# Patient Record
Sex: FEMALE | Race: WHITE | NOT HISPANIC OR LATINO | URBAN - METROPOLITAN AREA
[De-identification: names, ages, dates, MRNs, and addresses within clinical notes are randomized per-mention and may not be internally consistent; named-entity substitution may affect disease eponyms.]

---

## 2018-11-07 ENCOUNTER — APPOINTMENT (OUTPATIENT)
Dept: WOMENS IMAGING | Facility: HOSPITAL | Age: 53
End: 2018-11-07

## 2018-11-07 PROCEDURE — 77067 SCR MAMMO BI INCL CAD: CPT | Performed by: RADIOLOGY

## 2018-11-07 PROCEDURE — 77063 BREAST TOMOSYNTHESIS BI: CPT | Performed by: RADIOLOGY

## 2019-03-20 ENCOUNTER — OFFICE VISIT (OUTPATIENT)
Dept: CARDIOLOGY | Facility: CLINIC | Age: 54
End: 2019-03-20

## 2019-03-20 VITALS
WEIGHT: 228 LBS | SYSTOLIC BLOOD PRESSURE: 130 MMHG | BODY MASS INDEX: 34.56 KG/M2 | HEIGHT: 68 IN | DIASTOLIC BLOOD PRESSURE: 80 MMHG | HEART RATE: 80 BPM

## 2019-03-20 DIAGNOSIS — R00.2 PALPITATIONS: Primary | ICD-10-CM

## 2019-03-20 PROCEDURE — 93000 ELECTROCARDIOGRAM COMPLETE: CPT | Performed by: INTERNAL MEDICINE

## 2019-03-20 PROCEDURE — 99204 OFFICE O/P NEW MOD 45 MIN: CPT | Performed by: INTERNAL MEDICINE

## 2019-03-20 RX ORDER — OMEPRAZOLE 40 MG/1
40 CAPSULE, DELAYED RELEASE ORAL DAILY
COMMUNITY
Start: 2019-03-11 | End: 2021-01-18 | Stop reason: HOSPADM

## 2019-03-21 NOTE — PROGRESS NOTES
Subjective:     Encounter Date:03/20/2019      Patient ID: Vivienne Barbour is a 54 y.o. female.    Chief Complaint:  Palpitations    This is a new problem. The current episode started more than 1 month ago. The problem occurs intermittently. The problem has been waxing and waning. Associated symptoms include malaise/fatigue.       54-year old female who presents today for evaluation.  Patient said about 10 months ago she was at a [employer]  outing.  She was walking a large amount and when she got incredibly short of breath dizzy started having tingling in her fingers.  Her heart rate went to as high as 240 bpm.  She also got incredibly short of breath.  Patient said she sat down and things had improved relatively quickly.  She says that since then she has had some intermittent episodes of ankle edema.  She also complains of chest discomfort that happened about a month or so ago.  She said it occurred after a meal radiated to her left shoulder.  She does have known acid reflux and is symptomatic from that from time to time.  She subsequently presents today for further evaluation    Review of Systems   Constitution: Positive for malaise/fatigue.   HENT: Positive for hearing loss.    Eyes: Positive for double vision.   Cardiovascular: Positive for palpitations.   Respiratory: Positive for wheezing.    All other systems reviewed and are negative.        ECG 12 Lead  Date/Time: 3/20/2019 8:20 AM  Performed by: Jarvis George MD  Authorized by: Jarvis George MD   Previous ECG: no previous ECG available  Rhythm: sinus rhythm    Clinical impression: normal ECG               Objective:     Physical Exam   Constitutional: She is oriented to person, place, and time. She appears well-developed.   HENT:   Head: Normocephalic.   Eyes: Conjunctivae are normal.   Neck: Normal range of motion.   Cardiovascular: Normal rate, regular rhythm and normal heart sounds.   Pulmonary/Chest: Breath sounds normal.   Abdominal:  Soft. Bowel sounds are normal.   Musculoskeletal: Normal range of motion. She exhibits no edema.   Neurological: She is alert and oriented to person, place, and time.   Skin: Skin is warm and dry.   Psychiatric: She has a normal mood and affect. Her behavior is normal.   Vitals reviewed.      Lab Review:       Assessment:          Diagnosis Plan   1. Palpitations  Adult Transthoracic Echo Complete W/ Cont if Necessary Per Protocol          Plan:       1.  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  Patient now presents having episode of palpitations along with shortness of breath and atypical chest discomfort.  Her chest discomfort could easily be reflux.  She had an episode of where she exerted herself walking up a hill during a Boy  outing.  [place] so she was not at a great altitude but the hill was steep.  I was set up for an echocardiogram to rule out any type of structural heart disease.  If her echo is unremarkable then doing further testing I would postpone until she starts to try to get herself in better shape.  I think she needs to start exercising consistently which she has not done and I would clinically see what happens.  If she gets better great and we know that this is deconditioning.  If she has further problems it would guide appropriate testing in the future.  2.  Palpitations really occur infrequently.  At this point I would follow clinically to we could consider a monitor if they recur more frequently.

## 2019-04-03 ENCOUNTER — HOSPITAL ENCOUNTER (OUTPATIENT)
Dept: CARDIOLOGY | Facility: HOSPITAL | Age: 54
Discharge: HOME | End: 2019-04-03
Admitting: INTERNAL MEDICINE

## 2019-04-03 VITALS
HEART RATE: 83 BPM | DIASTOLIC BLOOD PRESSURE: 80 MMHG | BODY MASS INDEX: 34.56 KG/M2 | HEIGHT: 68 IN | SYSTOLIC BLOOD PRESSURE: 140 MMHG | WEIGHT: 228 LBS

## 2019-04-03 DIAGNOSIS — R00.2 PALPITATIONS: ICD-10-CM

## 2019-04-03 LAB
ASCENDING AORTA: 2.7 CM
BH CV ECHO MEAS - ACS: 1.8 CM
BH CV ECHO MEAS - AO MAX PG (FULL): 3 MMHG
BH CV ECHO MEAS - AO MAX PG: 11.4 MMHG
BH CV ECHO MEAS - AO MEAN PG (FULL): 2.5 MMHG
BH CV ECHO MEAS - AO MEAN PG: 6.6 MMHG
BH CV ECHO MEAS - AO ROOT AREA (BSA CORRECTED): 1.3
BH CV ECHO MEAS - AO ROOT AREA: 6 CM^2
BH CV ECHO MEAS - AO ROOT DIAM: 2.8 CM
BH CV ECHO MEAS - AO V2 MAX: 168.7 CM/SEC
BH CV ECHO MEAS - AO V2 MEAN: 121.6 CM/SEC
BH CV ECHO MEAS - AO V2 VTI: 32.7 CM
BH CV ECHO MEAS - AVA(I,A): 2.5 CM^2
BH CV ECHO MEAS - AVA(I,D): 2.5 CM^2
BH CV ECHO MEAS - AVA(V,A): 2.4 CM^2
BH CV ECHO MEAS - AVA(V,D): 2.4 CM^2
BH CV ECHO MEAS - BSA(HAYCOCK): 2.3 M^2
BH CV ECHO MEAS - BSA: 2.2 M^2
BH CV ECHO MEAS - BZI_BMI: 34.7 KILOGRAMS/M^2
BH CV ECHO MEAS - BZI_METRIC_HEIGHT: 172.7 CM
BH CV ECHO MEAS - BZI_METRIC_WEIGHT: 103.4 KG
BH CV ECHO MEAS - EDV(MOD-SP2): 57 ML
BH CV ECHO MEAS - EDV(MOD-SP4): 83 ML
BH CV ECHO MEAS - EDV(TEICH): 154.9 ML
BH CV ECHO MEAS - EF(CUBED): 73.4 %
BH CV ECHO MEAS - EF(MOD-BP): 61 %
BH CV ECHO MEAS - EF(MOD-SP2): 57.9 %
BH CV ECHO MEAS - EF(MOD-SP4): 62.7 %
BH CV ECHO MEAS - EF(TEICH): 64.5 %
BH CV ECHO MEAS - ESV(MOD-SP2): 24 ML
BH CV ECHO MEAS - ESV(MOD-SP4): 31 ML
BH CV ECHO MEAS - ESV(TEICH): 55 ML
BH CV ECHO MEAS - FS: 35.7 %
BH CV ECHO MEAS - IVS/LVPW: 1
BH CV ECHO MEAS - IVSD: 1 CM
BH CV ECHO MEAS - LAT PEAK E' VEL: 12 CM/SEC
BH CV ECHO MEAS - LV DIASTOLIC VOL/BSA (35-75): 38.4 ML/M^2
BH CV ECHO MEAS - LV MASS(C)D: 226.9 GRAMS
BH CV ECHO MEAS - LV MASS(C)DI: 105 GRAMS/M^2
BH CV ECHO MEAS - LV MAX PG: 8.4 MMHG
BH CV ECHO MEAS - LV MEAN PG: 4.1 MMHG
BH CV ECHO MEAS - LV SYSTOLIC VOL/BSA (12-30): 14.3 ML/M^2
BH CV ECHO MEAS - LV V1 MAX: 145 CM/SEC
BH CV ECHO MEAS - LV V1 MEAN: 93.1 CM/SEC
BH CV ECHO MEAS - LV V1 VTI: 29 CM
BH CV ECHO MEAS - LVIDD: 5.6 CM
BH CV ECHO MEAS - LVIDS: 3.6 CM
BH CV ECHO MEAS - LVLD AP2: 6.5 CM
BH CV ECHO MEAS - LVLD AP4: 7.8 CM
BH CV ECHO MEAS - LVLS AP2: 5.4 CM
BH CV ECHO MEAS - LVLS AP4: 6 CM
BH CV ECHO MEAS - LVOT AREA (M): 2.8 CM^2
BH CV ECHO MEAS - LVOT AREA: 2.8 CM^2
BH CV ECHO MEAS - LVOT DIAM: 1.9 CM
BH CV ECHO MEAS - LVPWD: 1 CM
BH CV ECHO MEAS - MED PEAK E' VEL: 10 CM/SEC
BH CV ECHO MEAS - MR MAX PG: 46.1 MMHG
BH CV ECHO MEAS - MR MAX VEL: 339.4 CM/SEC
BH CV ECHO MEAS - MV A DUR: 0.1 SEC
BH CV ECHO MEAS - MV A MAX VEL: 104.3 CM/SEC
BH CV ECHO MEAS - MV DEC SLOPE: 455.5 CM/SEC^2
BH CV ECHO MEAS - MV DEC TIME: 0.18 SEC
BH CV ECHO MEAS - MV E MAX VEL: 88.3 CM/SEC
BH CV ECHO MEAS - MV E/A: 0.85
BH CV ECHO MEAS - MV MAX PG: 4.7 MMHG
BH CV ECHO MEAS - MV MEAN PG: 2.4 MMHG
BH CV ECHO MEAS - MV P1/2T MAX VEL: 89.2 CM/SEC
BH CV ECHO MEAS - MV P1/2T: 57.4 MSEC
BH CV ECHO MEAS - MV V2 MAX: 108.6 CM/SEC
BH CV ECHO MEAS - MV V2 MEAN: 74.6 CM/SEC
BH CV ECHO MEAS - MV V2 VTI: 24.8 CM
BH CV ECHO MEAS - MVA P1/2T LCG: 2.5 CM^2
BH CV ECHO MEAS - MVA(P1/2T): 3.8 CM^2
BH CV ECHO MEAS - MVA(VTI): 3.3 CM^2
BH CV ECHO MEAS - PA ACC TIME: 0.14 SEC
BH CV ECHO MEAS - PA MAX PG (FULL): 2.7 MMHG
BH CV ECHO MEAS - PA MAX PG: 5 MMHG
BH CV ECHO MEAS - PA PR(ACCEL): 16 MMHG
BH CV ECHO MEAS - PA V2 MAX: 111.5 CM/SEC
BH CV ECHO MEAS - PULM A REVS DUR: 0.08 SEC
BH CV ECHO MEAS - PULM A REVS VEL: 33.1 CM/SEC
BH CV ECHO MEAS - PULM DIAS VEL: 45.5 CM/SEC
BH CV ECHO MEAS - PULM S/D: 1
BH CV ECHO MEAS - PULM SYS VEL: 47.5 CM/SEC
BH CV ECHO MEAS - PVA(V,A): 2.1 CM^2
BH CV ECHO MEAS - PVA(V,D): 2.1 CM^2
BH CV ECHO MEAS - QP/QS: 0.6
BH CV ECHO MEAS - RAP SYSTOLE: 3 MMHG
BH CV ECHO MEAS - RV MAX PG: 2.3 MMHG
BH CV ECHO MEAS - RV MEAN PG: 1.4 MMHG
BH CV ECHO MEAS - RV V1 MAX: 76.2 CM/SEC
BH CV ECHO MEAS - RV V1 MEAN: 56.2 CM/SEC
BH CV ECHO MEAS - RV V1 VTI: 16 CM
BH CV ECHO MEAS - RVOT AREA: 3 CM^2
BH CV ECHO MEAS - RVOT DIAM: 2 CM
BH CV ECHO MEAS - RVSP: 26 MMHG
BH CV ECHO MEAS - SI(AO): 90.2 ML/M^2
BH CV ECHO MEAS - SI(CUBED): 60.3 ML/M^2
BH CV ECHO MEAS - SI(LVOT): 37.9 ML/M^2
BH CV ECHO MEAS - SI(MOD-SP2): 15.3 ML/M^2
BH CV ECHO MEAS - SI(MOD-SP4): 24.1 ML/M^2
BH CV ECHO MEAS - SI(TEICH): 46.2 ML/M^2
BH CV ECHO MEAS - SUP REN AO DIAM: 1.7 CM
BH CV ECHO MEAS - SV(AO): 195 ML
BH CV ECHO MEAS - SV(CUBED): 130.2 ML
BH CV ECHO MEAS - SV(LVOT): 81.9 ML
BH CV ECHO MEAS - SV(MOD-SP2): 33 ML
BH CV ECHO MEAS - SV(MOD-SP4): 52 ML
BH CV ECHO MEAS - SV(RVOT): 48.7 ML
BH CV ECHO MEAS - SV(TEICH): 99.9 ML
BH CV ECHO MEAS - TAPSE (>1.6): 2.4 CM2
BH CV ECHO MEAS - TR MAX VEL: 242.4 CM/SEC
BH CV ECHO MEASUREMENTS AVERAGE E/E' RATIO: 8.03
BH CV XLRA - RV BASE: 2.9 CM
BH CV XLRA - TDI S': 13 CM/SEC
LEFT ATRIUM VOLUME INDEX: 30 ML/M2
MAXIMAL PREDICTED HEART RATE: 167 BPM
SINUS: 2.7 CM
STJ: 1.8 CM
STRESS TARGET HR: 142 BPM

## 2019-04-03 PROCEDURE — 93306 TTE W/DOPPLER COMPLETE: CPT | Performed by: INTERNAL MEDICINE

## 2019-04-03 PROCEDURE — 93306 TTE W/DOPPLER COMPLETE: CPT

## 2019-04-08 ENCOUNTER — TELEPHONE (OUTPATIENT)
Dept: CARDIOLOGY | Facility: CLINIC | Age: 54
End: 2019-04-08

## 2019-04-08 NOTE — TELEPHONE ENCOUNTER
----- Message from MICHELLE Mcdaniels sent at 4/8/2019  9:20 AM EDT -----  Can you please make patient aware of normal echocardiogram.    Thanks,  CECE

## 2019-11-15 ENCOUNTER — APPOINTMENT (OUTPATIENT)
Dept: WOMENS IMAGING | Facility: HOSPITAL | Age: 54
End: 2019-11-15

## 2019-11-15 PROCEDURE — 77063 BREAST TOMOSYNTHESIS BI: CPT | Performed by: RADIOLOGY

## 2019-11-15 PROCEDURE — 77067 SCR MAMMO BI INCL CAD: CPT | Performed by: RADIOLOGY

## 2019-12-17 ENCOUNTER — APPOINTMENT (OUTPATIENT)
Dept: WOMENS IMAGING | Facility: HOSPITAL | Age: 54
End: 2019-12-17

## 2019-12-17 PROCEDURE — 76641 ULTRASOUND BREAST COMPLETE: CPT | Performed by: RADIOLOGY

## 2019-12-17 PROCEDURE — 77065 DX MAMMO INCL CAD UNI: CPT | Performed by: RADIOLOGY

## 2019-12-17 PROCEDURE — G0279 TOMOSYNTHESIS, MAMMO: HCPCS | Performed by: RADIOLOGY

## 2019-12-17 PROCEDURE — 77061 BREAST TOMOSYNTHESIS UNI: CPT | Performed by: RADIOLOGY

## 2020-12-11 ENCOUNTER — APPOINTMENT (OUTPATIENT)
Dept: WOMENS IMAGING | Facility: HOSPITAL | Age: 55
End: 2020-12-11

## 2020-12-11 PROCEDURE — 77063 BREAST TOMOSYNTHESIS BI: CPT | Performed by: RADIOLOGY

## 2020-12-11 PROCEDURE — 77067 SCR MAMMO BI INCL CAD: CPT | Performed by: RADIOLOGY

## 2021-01-04 ENCOUNTER — APPOINTMENT (OUTPATIENT)
Dept: WOMENS IMAGING | Facility: HOSPITAL | Age: 56
End: 2021-01-04

## 2021-01-04 PROCEDURE — 77061 BREAST TOMOSYNTHESIS UNI: CPT | Performed by: RADIOLOGY

## 2021-01-04 PROCEDURE — 77065 DX MAMMO INCL CAD UNI: CPT | Performed by: RADIOLOGY

## 2021-01-11 ENCOUNTER — APPOINTMENT (OUTPATIENT)
Dept: WOMENS IMAGING | Facility: HOSPITAL | Age: 56
End: 2021-01-11

## 2021-01-11 PROCEDURE — 19081 BX BREAST 1ST LESION STRTCTC: CPT | Performed by: RADIOLOGY

## 2021-01-13 ENCOUNTER — TELEPHONE (OUTPATIENT)
Dept: SURGERY | Facility: CLINIC | Age: 56
End: 2021-01-13

## 2021-01-13 NOTE — TELEPHONE ENCOUNTER
New patient appointment with Dr. Barbour is scheduled on 1/20/2021 @ 2:30pm.    Called and spoke to patient, patient expressed v/u of appointment time.    Sent patient a reminder letter in the mail.

## 2021-01-14 ENCOUNTER — TELEPHONE (OUTPATIENT)
Dept: SURGERY | Facility: CLINIC | Age: 56
End: 2021-01-14

## 2021-01-14 NOTE — TELEPHONE ENCOUNTER
Breast MRI is scheduled on 1/15/2021 @ 9:45am, patient arrival 9:15am.    Called and spoke to patient, patient expressed v/u of appointment times.

## 2021-01-15 ENCOUNTER — HOSPITAL ENCOUNTER (OUTPATIENT)
Dept: MRI IMAGING | Facility: HOSPITAL | Age: 56
Discharge: HOME | End: 2021-01-15
Admitting: SURGERY

## 2021-01-15 DIAGNOSIS — D05.12 DUCTAL CARCINOMA IN SITU (DCIS) OF LEFT BREAST: ICD-10-CM

## 2021-01-15 PROCEDURE — 77049 MRI BREAST C-+ W/CAD BI: CPT

## 2021-01-15 PROCEDURE — A9577 INJ MULTIHANCE: HCPCS | Performed by: SURGERY

## 2021-01-15 PROCEDURE — 0 GADOBENATE DIMEGLUMINE 529 MG/ML SOLUTION: Performed by: SURGERY

## 2021-01-15 RX ADMIN — GADOBENATE DIMEGLUMINE 20 ML: 529 INJECTION, SOLUTION INTRAVENOUS at 10:05

## 2021-01-18 ENCOUNTER — OFFICE VISIT (OUTPATIENT)
Dept: SURGERY | Facility: CLINIC | Age: 56
End: 2021-01-18

## 2021-01-18 ENCOUNTER — TRANSCRIBE ORDERS (OUTPATIENT)
Dept: SURGERY | Facility: CLINIC | Age: 56
End: 2021-01-18

## 2021-01-18 ENCOUNTER — PREP FOR SURGERY (OUTPATIENT)
Dept: OTHER | Facility: HOSPITAL | Age: 56
End: 2021-01-18

## 2021-01-18 VITALS
WEIGHT: 238 LBS | OXYGEN SATURATION: 99 % | BODY MASS INDEX: 36.07 KG/M2 | SYSTOLIC BLOOD PRESSURE: 154 MMHG | RESPIRATION RATE: 17 BRPM | HEART RATE: 77 BPM | DIASTOLIC BLOOD PRESSURE: 87 MMHG | HEIGHT: 68 IN

## 2021-01-18 DIAGNOSIS — D05.12 INTRADUCTAL CARCINOMA IN SITU OF LEFT BREAST: Primary | ICD-10-CM

## 2021-01-18 DIAGNOSIS — D05.12 DUCTAL CARCINOMA IN SITU (DCIS) OF LEFT BREAST: Primary | ICD-10-CM

## 2021-01-18 PROCEDURE — 99205 OFFICE O/P NEW HI 60 MIN: CPT | Performed by: SURGERY

## 2021-01-18 PROCEDURE — G2212 PROLONG OUTPT/OFFICE VIS: HCPCS | Performed by: SURGERY

## 2021-01-18 RX ORDER — HEPARIN SODIUM 5000 [USP'U]/ML
5000 INJECTION, SOLUTION INTRAVENOUS; SUBCUTANEOUS
Status: CANCELLED | OUTPATIENT
Start: 2021-02-09 | End: 2021-02-10

## 2021-01-18 RX ORDER — CLINDAMYCIN PHOSPHATE 900 MG/50ML
900 INJECTION INTRAVENOUS ONCE
Status: CANCELLED | OUTPATIENT
Start: 2021-02-09 | End: 2021-01-18

## 2021-01-18 RX ORDER — DIAZEPAM 5 MG/1
10 TABLET ORAL ONCE
Status: CANCELLED | OUTPATIENT
Start: 2021-02-09 | End: 2021-01-18

## 2021-01-18 RX ORDER — ERGOCALCIFEROL 1.25 MG/1
50000 CAPSULE ORAL
COMMUNITY
Start: 2021-01-10 | End: 2022-03-08

## 2021-01-18 RX ORDER — DOCUSATE SODIUM 250 MG
100 CAPSULE ORAL DAILY
COMMUNITY

## 2021-01-18 RX ORDER — MELOXICAM 15 MG/1
15 TABLET ORAL DAILY
COMMUNITY
Start: 2020-11-16

## 2021-01-18 NOTE — PROGRESS NOTES
[facility]     Referring Provider: Nikolay Hernandez MD     Chief complaint: Newly diagnosed DCIS     HPI: Ms. Vivienne Barbour is a 56 yo woman, seen at the request of Dr. 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 This was initially detected as an imaging abnormality on routine screening. Her work-up is detailed in the oncologic history below. Prior to the biopsy, she denies any breast lumps, pain, skin changes, or nipple discharge. She denies any prior history of abnormal mammograms or breast biopsies. She has a family history of breast cancer in her paternal grandmother (diagnosed at age 88). She denies any family history of ovarian cancer. She was joined today in clinic by her . She gave consent for him to be present during her examination and participate in the discussion.       Oncology/Hematology History Overview Note   11/07/18, Screening MMG with [facility] ([facility]):  Scattered areas of fibroglandular density. There is a stable oval mass measuring 15 millimeters seen in the posterior one-third region of the right breast at 10 o'clock. No suspicious masses, suspicious microcalcifications or new areas of architectural distortion are identified. There has been no significant change from the prior exam(s). In the left breast, no suspicious masses, significant calcifications or other abnormalities are seen.  BI-RADS 2: Benign.    11/15/19, Screening MMG with [facility] ([facility]):  Scattered areas of fibroglandular density. There is a focal asymmetry seen in the posterior one-third upper outer region of the left breast. In the right breast, no suspicious masses, significant calcifications or other abnormalities are seen.  BI-RADS 0: Incomplete.    12/17/19, Left Diagnostic MMG with [facility] & Left Breast US ([facility]):  MMG:  On the present examination, focal asymmetry in the left breast, upper outer does not persist. With all diagnostic views, this area is not seen. No  suspicious mammographic finding is seen.  US:  No suspicious sonographic finding is seen. A small incidental simple cyst with a thin smooth internal septations is present at the 1:30 location, 2 cm from the nipple. This is not the mammographic area which was evaluated and is a small incidental benign simple cyst.  BI-RADS 1: Negative.     Ductal carcinoma in situ (DCIS) of left breast   12/10/2020 Initial Diagnosis    Ductal carcinoma in situ (DCIS) of left breast     12/11/2020 Imaging    Screening MMG with [facility] ([facility]):  Scattered areas of fibroglandular density.  1. There are a few new punctate calcifications with grouped distribution seen in the middle one-third 1:30 o'clock region of the left breast.  2. There is a stable oval mass measuring 15 mm seen in the posterior one-third 10:30 o'clock region of the right breast. No suspicious masses, suspicious microcalcifications or new areas of architectural distortion are identified. There has been no significant change from the prior exam(s).  BI-RADS 0: Incomplete.     1/4/2021 Imaging    Left Diagnostic MMG with [facility] ([facility]):  On the present examination, there are new amorphous and indistinct calcifications measuring 4 mm with grouped distribution in the middle one-third 1:30 o'clock region of the left breast located 10 centimeters from the nipple. Additional imaging demonstrates microcalcifications are suspicious and tissue sampling is recommended.  Bi-RADS 4B: Suspicious.     1/11/2021 Biopsy    Left Breast, Stereotactic Biopsy ([facility]):    Breast, Left 1:30 O'clock, Core Needle Biopsy:  Ductal Carcinoma in Situ (DCIS), High Nuclear Grade, Comedo Pattern, 3 mm in Greatest Dimension, Present in 3 of 13 Cores.   Microcalcifications Associated with DCIS and Benign Mammary Ducts/Lobules.    ER negative (0.29%)  CA negative (0%)  Ki-67 32.68%    -Tophat clip. No residual calcifications were visible on post procedure mammogram.     1/15/2021 Imaging     Bilateral Breast MRI ([facility]):  RIGHT BREAST:    At 10:00 in the posterior right breast, approximately 9 cm posterior to the nipple, there is a 1.4 cm AP dimension, 0.9 cm transverse dimension, 0.9 cm craniocaudal dimension oval enhancing mass, which is mammographically stable dating back to at least 2016. No suspicious enhancing mass or area of non-mass enhancement is identified. The visualized axilla is within normal limits.    LEFT BREAST:    At 1:00, approximately 6 cm posterior to the nipple, there is an approximately 1.2 x 0.7 x 3.1 cm biopsy cavity/tract with a central focus of susceptibility from a biopsy clip, which marks the site of  biopsy-proven malignancy. There is subtle enhancement extending from the biopsy site to the overlying skin, which is likely postbiopsy in nature. No suspicious mass or mass enhancement is identified at this location or elsewhere within the left breast. No suspicious enhancement is identified in the left nipple or chest wall. The visualized axilla is within normal limits.   EXTRAMAMMARY FINDINGS:   There are no abnormally enlarged internal mammary chain lymph nodes on either side.  BI-RADS 6: Known malignancy.         Review of Systems   Constitutional: Positive for diaphoresis. Negative for appetite change, chills, fatigue, fever and unexpected weight change.        Weight gain over the last year   HENT:   Positive for hearing loss and trouble swallowing. Negative for lump/mass, mouth sores, nosebleeds, sore throat, tinnitus and voice change.    Eyes: Positive for eye problems. Negative for icterus.   Respiratory: Negative for chest tightness, cough, hemoptysis, shortness of breath and wheezing.    Cardiovascular: Positive for palpitations (occasional). Negative for chest pain and leg swelling.   Gastrointestinal: Negative for abdominal distention, abdominal pain, blood in stool, constipation, diarrhea, nausea, rectal pain and vomiting.   Endocrine: Positive for hot flashes.    Genitourinary: Positive for nocturia. Negative for bladder incontinence, difficulty urinating, dyspareunia, dysuria, frequency, hematuria, menstrual problem, pelvic pain, vaginal bleeding and vaginal discharge.    Musculoskeletal: Positive for arthralgias. Negative for back pain, flank pain, gait problem, myalgias, neck pain and neck stiffness.   Skin: Negative for itching, rash and wound.   Neurological: Positive for headaches. Negative for dizziness, extremity weakness, gait problem, light-headedness, numbness, seizures and speech difficulty.   Hematological: Negative for adenopathy. Bruises/bleeds easily.   Psychiatric/Behavioral: Positive for decreased concentration and depression. Negative for confusion, sleep disturbance and suicidal ideas. The patient is not nervous/anxious.    I personally reviewed and updated the ROS.      Medications:    Current Outpatient Medications:   •  docusate sodium (COLACE) 250 MG capsule, Take 250 mg by mouth Daily., Disp: , Rfl:   •  meloxicam (MOBIC) 15 MG tablet, TK 1 T PO QD, Disp: , Rfl:   •  acyclovir (ZOVIRAX) 400 MG tablet, Take 400 mg by mouth 3 (Three) Times a Day. Take no more than 5 doses a day., Disp: , Rfl:   •  aspirin 81 MG tablet, Take 81 mg by mouth., Disp: , Rfl:   •  buPROPion SR (WELLBUTRIN SR) 200 MG 12 hr tablet, Take 200 mg by mouth Daily., Disp: , Rfl:   •  Cholecalciferol (VITAMIN D3) 2000 units tablet, Take  by mouth Daily., Disp: , Rfl:   •  escitalopram (LEXAPRO) 20 MG tablet, Take 20 mg by mouth., Disp: , Rfl:   •  PRENATAL 28-0.8 MG tablet, Take  by mouth Daily., Disp: , Rfl:   •  TURMERIC CURCUMIN PO, Take  by mouth Daily., Disp: , Rfl:   •  vitamin D (ERGOCALCIFEROL) 1.25 MG (98847 UT) capsule capsule, TAKE ONE CAPSULE BY MOUTH EVERY MONTH, Disp: , Rfl:       Allergies   Allergen Reactions   • Other Itching     thimerosal   • Vilazodone Hcl Other (See Comments)     hallucinations   • Morphine Itching   • Penicillins Rash   • Sulfa Antibiotics  Rash   • Thimerosal Itching       Past Medical History:   Diagnosis Date   • Acid reflux disease    • ADD (attention deficit disorder) without hyperactivity    • Allergic rhinitis    • BMI 35.0-35.9,adult    • Chronic back pain    • Chronic hip pain, left    • Depression    • Elevated blood pressure reading    • Frequent PVCs    • Hearing loss    • Hypochromic erythrocytes    • Knee pain    • Migraine syndrome    • Osteopenia    • Polyp of sigmoid colon    • Post-menopausal    • Shortness of breath on exertion    • Tachycardia    • Vitamin D deficiency    • Wheezing on exhalation        Past Surgical History:   Procedure Laterality Date   • COLONOSCOPY  05/2015    Dr. Ramires   • CYSTOSCOPY      diagnostic, Dr. Dominique [facility]   • HYSTERECTOMY  2006    bladder track, recrocele/cystocele/endometriosis   • KNEE ARTHROSCOPY         Family History   Problem Relation Age of Onset   • Hypertension Mother    • Colon polyps Mother         sigmoid colon   • Thyroid cancer Mother    • Heart disease Mother    • Benign prostatic hyperplasia Father    • Cancer Father         bladder   • Colon polyps Father         sigmoid colon   • Hypertension Father    • Colon polyps Brother         sigmoid colon   • Hypertension Brother    • Colon cancer Maternal Grandmother    • Colon cancer Maternal Grandfather    • Heart disease Maternal Grandfather    • Heart attack Maternal Grandfather    • Colon cancer Paternal Uncle    • Heart disease Maternal Uncle    • Stroke Paternal Grandmother    • Breast cancer Paternal Grandmother 88   • Diabetes Paternal Grandfather    • Hypertension Brother        Social History     Socioeconomic History   • Marital status:      Spouse name: Not on file   • Number of children: 2   • Years of education: Not on file   • Highest education level: Not on file   Occupational History   • Occupation: RN, [employer]   Tobacco Use   • Smoking status: Never Smoker   • Smokeless tobacco: Never Used   • Tobacco  comment: CAFFEINE USE 1 CUP TEA DAILY   Substance and Sexual Activity   • Alcohol use: Yes     Comment: 1-2/per month   • Drug use: Never   • Sexual activity: Defer   Social History Narrative    1 daughter, 1 son        GYNECOLOGIC HISTORY:   G: 3. P: 2. AB: 1.  Last menstrual period: , sp LAVH for endometriosis, still has both ovaries  Age at menarche: 13  Age at first childbirth: 34  Lactation/How lon year  Age at menopause: Unsure  Total years of oral contraceptive use: 14  Total years of hormone replacement therapy: 0      PHYSICAL EXAMINATION:   Vitals:    21 1209   BP: 154/87   Pulse: 77   Resp: 17   SpO2: 99%     ECOG 0 - Asymptomatic  General: NAD, well appearing  Psych: a&o x 3, normal mood and affect  Eyes: EOMI, no scleral icterus  ENMT: neck supple without masses or thyromegaly, mucus membranes moist  Resp: normal effort, CTAB  CV: RRR, no murmurs, no edema  GI: soft, NT, ND  MSK: normal gait, normal ROM in bilateral shoulders  Lymph nodes: no cervical, supraclavicular or axillary lymphadenopathy  Breast: symmetric, large size, grade 3 ptosis  Right: No visible abnormalities on inspection while seated, with arms raised or hands on hips. No masses, skin changes, or nipple abnormalities.  Left: UOQ ecchymoses with associated biopsy site, otherwise no visible abnormalities on inspection while seated, with arms raised or hands on hips. No masses or nipple abnormalities.    Left breast, in-office ultrasound: At 2:00, 9 cm FN, there is a postbiopsy hematoma with biopsy clip visualized. This is located about 15 mm deep to the skin.       I have independently reviewed her imaging and here are my findings:   In the left upper outer quadrant, there initially was a 4 mm cluster of calcifications. On MRI there is a 3.1 cm biopsy tract without any residual suspicious enhancement.      Assessment:  55 y.o. 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    Discussion:  I had an extensive discussion with the patient and her  about the nature of her DCIS diagnosis and treatment options. We reviewed the components of breast tissue including ducts and lobules. We reviewed her pathology report in detail. We reviewed breast cancer histology, including stage, grade, receptors and how this applies to her diagnosis. We discussed the fact that we cannot accurately predict which patients with DCIS will progress to invasive cancer. We also discussed the fact that we cannot rule out current invasive cancer and that if there is invasive cancer found on final pathology, this would change the treatment plan.      We reviewed potential surgical treatments to include partial mastectomy versus mastectomy. We discussed the risks and benefits of both approaches. Regarding radiation therapy, we discussed that radiation is indicated in all cases of breast conservation and in only limited circumstances following mastectomy. I explained that the primary goal of adjuvant radiation is to decrease the likelihood of local recurrence. Regarding systemic therapy, we discussed that chemotherapy is not indicated in the treatment of DCIS. We briefly discussed the use of endocrine therapy to decrease the likelihood of a second primary tumor, but will refer her to medical oncology to discuss this postoperatively.    In her case, she is a good candidate for lumpectomy and she would like to proceed with breast conservation. We discussed that lumpectomy would require preoperative wire-localization. We also discussed the risk of positive margins and that she must have negative margins for lumpectomy to be an appropriate oncologic procedure. I will make every effort to obtain negative margins at her initial operation, but there is a 10-15% chance that she will require a second operation for re-excision, or possibly a total mastectomy. We will not know the margin status until after her  final pathology has returned.     We discussed that most breast cancer is not hereditary, however given her family history of colon cancer, she qualifies for genetic testing from a colon perspective and some of these genes can overlap with breast cancer. Genetic testing is warranted and a was sent today. This could affect surgical decision making. Should the results show a pathologic mutation, I would recommend a mastectomy on the cancer side and a contralateral risk-reduction mastectomy. She understands that this would not be for the treatment of her left breast DCIS and will not improve her prognosis long term. This would be to reduce her risk of a second, primary breast cancer. She understands that risk reduction is excellent with mastectomy, but not 100%. If she is negative for a mutation, then I would not recommend a bilateral mastectomy, since her risk of a second primary cancer would be relatively low and endocrine therapy could further reduce this risk.    We discussed that axillary staging is usually not indicated for DCIS, but if invasive cancer is found on final pathology after lumpectomy, she would need a second operation for sentinel lymph node biopsy.    I described additional risks and potential complications associated with surgery, including, but not limited to, bleeding, infection, deformity/poor cosmetic result, chronic pain, numbness, seroma, hematoma, deep venous thrombosis, skin flap necrosis, disease recurrence and the possibility of requiring additional surgery. We also discussed other treatment options including the option of not undergoing any surgical treatment and the risks associated with this including disease progression. She expressed an understanding of these factors and wished to proceed.    Plan:  A multidisciplinary plan has been formulated for the patient:      (1) Breast Surgical Oncology:  -F/u genetic testing. I will call her with results.  -Left needle-localized partial  mastectomy    (2) Medical Oncology:  -Will refer postoperatively.    (3) Radiation Oncology:  -Will refer postoperatively.    Fernanda Barbour MD      I spent 80 minutes caring for Vivienne on this date of service. This time includes time spent by me in the following activities: preparing for the visit, performing a medically appropriate examination and/or evaluation, counseling and educating the patient/family/caregiver, ordering medications, tests, or procedures, documenting information in the medical record and independently interpreting results and communicating that information with the patient/family/caregiver.      CC:  MD Tala Russo DO Ann Grider, MD

## 2021-01-19 ENCOUNTER — TELEPHONE (OUTPATIENT)
Dept: SURGERY | Facility: CLINIC | Age: 56
End: 2021-01-19

## 2021-01-19 NOTE — TELEPHONE ENCOUNTER
Surgery is scheduled on 2/9/2021 @ 11:00am, NL @ 9:00am, patient arrival 7:00am.    PAT is scheduled on 2/2/2021 @ 9:30am.    Post-op appointment with Dr. Barbour is scheduled on 2/25/2021 @ 8:00am.    Called and spoke to patient, patient expressed verbal understanding of appointment times.

## 2021-01-20 ENCOUNTER — TELEPHONE (OUTPATIENT)
Dept: OTHER | Facility: HOSPITAL | Age: 56
End: 2021-01-20

## 2021-01-20 NOTE — TELEPHONE ENCOUNTER
Referral received from Dr. Barbour's office. I called [employer]ber[employer] and introduced myself and navigational services. She states the plan is to have a lumpectomy on Feb 9th. 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 She had a question about getting tested for COVID antibodies and we discussed that.     She stated she has a good support system at home and wonderful friends she can talk to. She has no needs at this time.     We discussed integrative therapies and other services at [facility]. She verbalized interest in receiving a navigation folder outlining services. I verified her mailing address and will send out a navigation folder with the following information:     [facility],  [facility] Breast Cancer Support Group, Cancer and Restorative Exercise (CARE), [employer] Exercise program, Guide for the Newly Diagnosed, [facility], [facility], Massage Therapy, Reiki Therapy, [employer] [place], Cancer Nutrition, Cleaning for [facility], and Survivorship Clinic.    She verbalized appreciation for navigational services and she has my contact information and will call with any questions that arise.

## 2021-01-28 ENCOUNTER — TRANSCRIBE ORDERS (OUTPATIENT)
Dept: PREADMISSION TESTING | Facility: HOSPITAL | Age: 56
End: 2021-01-28

## 2021-01-28 DIAGNOSIS — Z01.818 OTHER SPECIFIED PRE-OPERATIVE EXAMINATION: Primary | ICD-10-CM

## 2021-02-02 ENCOUNTER — APPOINTMENT (OUTPATIENT)
Dept: PREADMISSION TESTING | Facility: HOSPITAL | Age: 56
End: 2021-02-02

## 2021-02-02 VITALS
HEIGHT: 68 IN | BODY MASS INDEX: 36.03 KG/M2 | WEIGHT: 237.7 LBS | SYSTOLIC BLOOD PRESSURE: 147 MMHG | DIASTOLIC BLOOD PRESSURE: 87 MMHG | HEART RATE: 90 BPM | RESPIRATION RATE: 16 BRPM | TEMPERATURE: 97.3 F | OXYGEN SATURATION: 98 %

## 2021-02-02 DIAGNOSIS — D05.12 DUCTAL CARCINOMA IN SITU (DCIS) OF LEFT BREAST: ICD-10-CM

## 2021-02-02 LAB
ANION GAP SERPL CALCULATED.3IONS-SCNC: 9.3 MMOL/L (ref 5–15)
BASOPHILS # BLD AUTO: 0.03 10*3/MM3 (ref 0–0.2)
BASOPHILS NFR BLD AUTO: 0.5 % (ref 0–1.5)
BUN SERPL-MCNC: 17 MG/DL (ref 6–20)
BUN/CREAT SERPL: 18.9 (ref 7–25)
CALCIUM SPEC-SCNC: 9.3 MG/DL (ref 8.6–10.5)
CHLORIDE SERPL-SCNC: 102 MMOL/L (ref 98–107)
CO2 SERPL-SCNC: 28.7 MMOL/L (ref 22–29)
CREAT SERPL-MCNC: 0.9 MG/DL (ref 0.57–1)
DEPRECATED RDW RBC AUTO: 42.1 FL (ref 37–54)
EOSINOPHIL # BLD AUTO: 0.1 10*3/MM3 (ref 0–0.4)
EOSINOPHIL NFR BLD AUTO: 1.8 % (ref 0.3–6.2)
ERYTHROCYTE [DISTWIDTH] IN BLOOD BY AUTOMATED COUNT: 13.8 % (ref 12.3–15.4)
GFR SERPL CREATININE-BSD FRML MDRD: 65 ML/MIN/1.73
GLUCOSE SERPL-MCNC: 88 MG/DL (ref 65–99)
HCT VFR BLD AUTO: 42.4 % (ref 34–46.6)
HGB BLD-MCNC: 14 G/DL (ref 12–15.9)
IMM GRANULOCYTES # BLD AUTO: 0.01 10*3/MM3 (ref 0–0.05)
IMM GRANULOCYTES NFR BLD AUTO: 0.2 % (ref 0–0.5)
LYMPHOCYTES # BLD AUTO: 1.76 10*3/MM3 (ref 0.7–3.1)
LYMPHOCYTES NFR BLD AUTO: 31.5 % (ref 19.6–45.3)
MCH RBC QN AUTO: 27.5 PG (ref 26.6–33)
MCHC RBC AUTO-ENTMCNC: 33 G/DL (ref 31.5–35.7)
MCV RBC AUTO: 83.3 FL (ref 79–97)
MONOCYTES # BLD AUTO: 0.69 10*3/MM3 (ref 0.1–0.9)
MONOCYTES NFR BLD AUTO: 12.4 % (ref 5–12)
NEUTROPHILS NFR BLD AUTO: 2.99 10*3/MM3 (ref 1.7–7)
NEUTROPHILS NFR BLD AUTO: 53.6 % (ref 42.7–76)
NRBC BLD AUTO-RTO: 0 /100 WBC (ref 0–0.2)
PLATELET # BLD AUTO: 236 10*3/MM3 (ref 140–450)
PMV BLD AUTO: 9.6 FL (ref 6–12)
POTASSIUM SERPL-SCNC: 3.6 MMOL/L (ref 3.5–5.2)
QT INTERVAL: 361 MS
RBC # BLD AUTO: 5.09 10*6/MM3 (ref 3.77–5.28)
SODIUM SERPL-SCNC: 140 MMOL/L (ref 136–145)
WBC # BLD AUTO: 5.58 10*3/MM3 (ref 3.4–10.8)

## 2021-02-02 PROCEDURE — 80048 BASIC METABOLIC PNL TOTAL CA: CPT

## 2021-02-02 PROCEDURE — 85025 COMPLETE CBC W/AUTO DIFF WBC: CPT

## 2021-02-02 PROCEDURE — 36415 COLL VENOUS BLD VENIPUNCTURE: CPT

## 2021-02-02 PROCEDURE — 93005 ELECTROCARDIOGRAM TRACING: CPT

## 2021-02-02 PROCEDURE — 93010 ELECTROCARDIOGRAM REPORT: CPT | Performed by: INTERNAL MEDICINE

## 2021-02-02 RX ORDER — SUMATRIPTAN 50 MG/1
50 TABLET, FILM COATED ORAL
COMMUNITY

## 2021-02-02 RX ORDER — CYCLOBENZAPRINE HCL 5 MG
5 TABLET ORAL 3 TIMES DAILY PRN
COMMUNITY

## 2021-02-02 RX ORDER — METHENAMINE, SODIUM PHOSPHATE, MONOBASIC, MONOHYDRATE, PHENYL SALICYLATE, METHYLENE BLUE, AND HYOSCYAMINE SULFATE 120; 40.8; 36; 10; .12 MG/1; MG/1; MG/1; MG/1; MG/1
1 CAPSULE ORAL AS NEEDED
COMMUNITY

## 2021-02-02 RX ORDER — OMEPRAZOLE 20 MG/1
20 CAPSULE, DELAYED RELEASE ORAL DAILY
COMMUNITY
End: 2021-07-14

## 2021-02-02 NOTE — DISCHARGE INSTRUCTIONS
Take the following medications the morning of surgery:  OMEPRAZOLE, LEXAPRO, AND WELLBUTRIN     ARRIVAL TIME 0700 TO MAIN OR         If you are on prescription narcotic pain medication to control your pain you may also take that medication the morning of surgery.    General Instructions:  • Do not eat solid food after midnight the night before surgery.  • You may drink clear liquids day of surgery but must stop at least one hour before your hospital arrival time.  • It is beneficial for you to have a clear drink that contains carbohydrates the day of surgery.  We suggest a 12 to 20 ounce bottle of Gatorade or Powerade for non-diabetic patients or a 12 to 20 ounce bottle of G2 or Powerade Zero for diabetic patients. (Pediatric patients, are not advised to drink a 12 to 20 ounce carbohydrate drink)    Clear liquids are liquids you can see through.  Nothing red in color.     Plain water                               Sports drinks  Sodas                                   Gelatin (Jell-O)  Fruit juices without pulp such as white grape juice and apple juice  Popsicles that contain no fruit or yogurt  Tea or coffee (no cream or milk added)  Gatorade / Powerade  G2 / Powerade Zero    • Infants may have breast milk up to four hours before surgery.  • Infants drinking formula may drink formula up to six hours before surgery.   • Patients who avoid smoking, chewing tobacco and alcohol for 4 weeks prior to surgery have a reduced risk of post-operative complications.  Quit smoking as many days before surgery as you can.  • Do not smoke, use chewing tobacco or drink alcohol the day of surgery.   • If applicable bring your C-PAP/ BI-PAP machine.  • Bring any papers given to you in the doctor’s office.  • Wear clean comfortable clothes.  • Do not wear contact lenses, false eyelashes or make-up.  Bring a case for your glasses.   • Bring crutches or walker if applicable.  • Remove all piercings.  Leave jewelry and any other valuables  at home.  • Hair extensions with metal clips must be removed prior to surgery.  • The Pre-Admission Testing nurse will instruct you to bring medications if unable to obtain an accurate list in Pre-Admission Testing.            Preventing a Surgical Site Infection:  • For 2 to 3 days before surgery, avoid shaving with a razor because the razor can irritate skin and make it easier to develop an infection.    • Any areas of open skin can increase the risk of a post-operative wound infection by allowing bacteria to enter and travel throughout the body.  Notify your surgeon if you have any skin wounds / rashes even if it is not near the expected surgical site.  The area will need assessed to determine if surgery should be delayed until it is healed.  • The night prior to surgery shower using a fresh bar of anti-bacterial soap (such as Dial) and clean washcloth.  Sleep in a clean bed with clean clothing.  Do not allow pets to sleep with you.  • Shower on the morning of surgery using a fresh bar of anti-bacterial soap (such as Dial) and clean washcloth.  Dry with a clean towel and dress in clean clothing.  • Ask your surgeon if you will be receiving antibiotics prior to surgery.  • Make sure you, your family, and all healthcare providers clean their hands with soap and water or an alcohol based hand  before caring for you or your wound.    Day of surgery:  Your arrival time is approximately two hours before your scheduled surgery time.  Upon arrival, a Pre-op nurse and Anesthesiologist will review your health history, obtain vital signs, and answer questions you may have.  The only belongings needed at this time will be a list of your home medications and if applicable your C-PAP/BI-PAP machine.  A Pre-op nurse will start an IV and you may receive medication in preparation for surgery, including something to help you relax.     Please be aware that surgery does come with discomfort.  We want to make every effort  to control your discomfort so please discuss any uncontrolled symptoms with your nurse.   Your doctor will most likely have prescribed pain medications.      If you are going home after surgery you will receive individualized written care instructions before being discharged.  A responsible adult must drive you to and from the hospital on the day of your surgery and stay with you for 24 hours.  Discharge prescriptions can be filled by the hospital pharmacy during regular pharmacy hours.  If you are having surgery late in the day/evening your prescription may be e-prescribed to your pharmacy.  Please verify your pharmacy hours or chose a 24 hour pharmacy to avoid not having access to your prescription because your pharmacy has closed for the day.    If you are staying overnight following surgery, you will be transported to your hospital room following the recovery period.  [facility] has all private rooms.    If you have any questions please call Pre-Admission Testing at [phone].  Deductibles and co-payments are collected on the day of service. Please be prepared to pay the required co-pay, deductible or deposit on the day of service as defined by your plan.    Patient Education for Self-Quarantine Process    Following your COVID testing, we strongly recommend that you do not leave your home after you have been tested for COVID except to get medical care. This includes not going to work, school or to public areas.  If this is not possible for you to do please limit your activities to only required outings.  Be sure to wear a mask when you are with other people, practice social distancing and wash your hands frequently.      The following items provide additional details to keep you safe.  • Wash your hands with soap and water frequently for at least 20 seconds.   • Avoid touching your eyes, nose and mouth with unwashed hands.  • Do not share anything - utensils, towels, food from the same bowl.    • Have your own utensils, drinking glass, dishes, towels and bedding.   • Do not have visitors.   • Do use FaceTime to stay in touch with family and friends.  • You should stay in a specific room away from others if possible.   • Stay at least 6 feet away from others in the home if you cannot have a dedicated room to yourself.   • Do not snuggle with your pet. While the CDC says there is no evidence that pets can spread COVID-19 or be infected from humans, it is probably best to avoid “petting, snuggling, being kissed or licked and sharing food (during self-quarantine)”, according to the CDC.   • Sanitize household surfaces daily. Include all high touch areas (door handles, light switches, phones, countertops, etc.)  • Do not share a bathroom with others, if possible.   • Wear a mask around others in your home if you are unable to stay in a separate room or 6 feet apart. If  you are unable to wear a mask, have your family member wear a mask if they must be within 6 feet of you.   Call your surgeon immediately if you experience any of the following symptoms:  • Sore Throat  • Shortness of Breath or difficulty breathing  • Cough  • Chills  • Body soreness or muscle pain  • Headache  • Fever  • New loss of taste or smell  • Do not arrive for your surgery ill.  Your procedure will need to be rescheduled to another time.  You will need to call your physician before the day of surgery to avoid any unnecessary exposure to hospital staff as well as other patients.

## 2021-02-03 ENCOUNTER — TELEPHONE (OUTPATIENT)
Dept: SURGERY | Facility: CLINIC | Age: 56
End: 2021-02-03

## 2021-02-03 NOTE — TELEPHONE ENCOUNTER
I called patient and let her know that her genetic testing was negative for a mutation.      Dr. Barbour will give her a copy of the results at her postoperative appointment.

## 2021-02-06 ENCOUNTER — LAB (OUTPATIENT)
Dept: LAB | Facility: HOSPITAL | Age: 56
End: 2021-02-06

## 2021-02-06 DIAGNOSIS — Z01.818 OTHER SPECIFIED PRE-OPERATIVE EXAMINATION: ICD-10-CM

## 2021-02-06 PROCEDURE — U0004 COV-19 TEST NON-CDC HGH THRU: HCPCS

## 2021-02-06 PROCEDURE — C9803 HOPD COVID-19 SPEC COLLECT: HCPCS

## 2021-02-08 LAB — SARS-COV-2 RNA RESP QL NAA+PROBE: NOT DETECTED

## 2021-02-09 ENCOUNTER — APPOINTMENT (OUTPATIENT)
Dept: GENERAL RADIOLOGY | Facility: HOSPITAL | Age: 56
End: 2021-02-09

## 2021-02-09 ENCOUNTER — ANESTHESIA (OUTPATIENT)
Dept: PERIOP | Facility: HOSPITAL | Age: 56
End: 2021-02-09

## 2021-02-09 ENCOUNTER — ANESTHESIA EVENT (OUTPATIENT)
Dept: PERIOP | Facility: HOSPITAL | Age: 56
End: 2021-02-09

## 2021-02-09 ENCOUNTER — HOSPITAL ENCOUNTER (OUTPATIENT)
Dept: MAMMOGRAPHY | Facility: HOSPITAL | Age: 56
Discharge: HOME | End: 2021-02-09

## 2021-02-09 ENCOUNTER — HOSPITAL ENCOUNTER (OUTPATIENT)
Facility: HOSPITAL | Age: 56
Setting detail: HOSPITAL OUTPATIENT SURGERY
Discharge: HOME | End: 2021-02-09
Attending: SURGERY | Admitting: SURGERY

## 2021-02-09 VITALS
DIASTOLIC BLOOD PRESSURE: 89 MMHG | OXYGEN SATURATION: 95 % | BODY MASS INDEX: 35.64 KG/M2 | TEMPERATURE: 98.4 F | HEART RATE: 90 BPM | RESPIRATION RATE: 16 BRPM | SYSTOLIC BLOOD PRESSURE: 156 MMHG | HEIGHT: 68 IN | WEIGHT: 235.2 LBS

## 2021-02-09 DIAGNOSIS — D05.12 DUCTAL CARCINOMA IN SITU (DCIS) OF LEFT BREAST: ICD-10-CM

## 2021-02-09 DIAGNOSIS — D05.12 INTRADUCTAL CARCINOMA IN SITU OF LEFT BREAST: ICD-10-CM

## 2021-02-09 PROCEDURE — 25010000002 FENTANYL CITRATE (PF) 100 MCG/2ML SOLUTION: Performed by: NURSE ANESTHETIST, CERTIFIED REGISTERED

## 2021-02-09 PROCEDURE — 25010000002 DEXAMETHASONE PER 1 MG: Performed by: NURSE ANESTHETIST, CERTIFIED REGISTERED

## 2021-02-09 PROCEDURE — 19301 PARTIAL MASTECTOMY: CPT | Performed by: SURGERY

## 2021-02-09 PROCEDURE — 25010000002 KETOROLAC TROMETHAMINE PER 15 MG: Performed by: NURSE ANESTHETIST, CERTIFIED REGISTERED

## 2021-02-09 PROCEDURE — 25010000002 PROPOFOL 10 MG/ML EMULSION: Performed by: NURSE ANESTHETIST, CERTIFIED REGISTERED

## 2021-02-09 PROCEDURE — 25010000003 LIDOCAINE 1 % SOLUTION: Performed by: SURGERY

## 2021-02-09 PROCEDURE — 25010000002 SUCCINYLCHOLINE PER 20 MG: Performed by: NURSE ANESTHETIST, CERTIFIED REGISTERED

## 2021-02-09 PROCEDURE — 25010000002 ONDANSETRON PER 1 MG: Performed by: NURSE ANESTHETIST, CERTIFIED REGISTERED

## 2021-02-09 PROCEDURE — 14001 TIS TRNFR TRUNK 10.1-30SQCM: CPT | Performed by: SURGERY

## 2021-02-09 PROCEDURE — 76098 X-RAY EXAM SURGICAL SPECIMEN: CPT

## 2021-02-09 PROCEDURE — 88307 TISSUE EXAM BY PATHOLOGIST: CPT | Performed by: SURGERY

## 2021-02-09 PROCEDURE — 25010000002 HEPARIN (PORCINE) PER 1000 UNITS: Performed by: SURGERY

## 2021-02-09 PROCEDURE — 25010000002 MIDAZOLAM PER 1 MG: Performed by: ANESTHESIOLOGY

## 2021-02-09 DEVICE — LIGACLIP MCA MULTIPLE CLIP APPLIERS, 20 SMALL CLIPS
Type: IMPLANTABLE DEVICE | Site: BREAST | Status: FUNCTIONAL
Brand: LIGACLIP

## 2021-02-09 RX ORDER — FENTANYL CITRATE 50 UG/ML
50 INJECTION, SOLUTION INTRAMUSCULAR; INTRAVENOUS
Status: DISCONTINUED | OUTPATIENT
Start: 2021-02-09 | End: 2021-02-09 | Stop reason: HOSPADM

## 2021-02-09 RX ORDER — HYDROMORPHONE HYDROCHLORIDE 1 MG/ML
0.5 INJECTION, SOLUTION INTRAMUSCULAR; INTRAVENOUS; SUBCUTANEOUS
Status: DISCONTINUED | OUTPATIENT
Start: 2021-02-09 | End: 2021-02-09 | Stop reason: HOSPADM

## 2021-02-09 RX ORDER — FLUMAZENIL 0.1 MG/ML
0.2 INJECTION INTRAVENOUS AS NEEDED
Status: DISCONTINUED | OUTPATIENT
Start: 2021-02-09 | End: 2021-02-09 | Stop reason: HOSPADM

## 2021-02-09 RX ORDER — CLINDAMYCIN PHOSPHATE 900 MG/50ML
900 INJECTION INTRAVENOUS ONCE
Status: COMPLETED | OUTPATIENT
Start: 2021-02-09 | End: 2021-02-09

## 2021-02-09 RX ORDER — IPRATROPIUM BROMIDE AND ALBUTEROL SULFATE 2.5; .5 MG/3ML; MG/3ML
3 SOLUTION RESPIRATORY (INHALATION) ONCE AS NEEDED
Status: DISCONTINUED | OUTPATIENT
Start: 2021-02-09 | End: 2021-02-09 | Stop reason: HOSPADM

## 2021-02-09 RX ORDER — OXYCODONE AND ACETAMINOPHEN 7.5; 325 MG/1; MG/1
1 TABLET ORAL ONCE AS NEEDED
Status: COMPLETED | OUTPATIENT
Start: 2021-02-09 | End: 2021-02-09

## 2021-02-09 RX ORDER — DIAZEPAM 5 MG/1
10 TABLET ORAL ONCE
Status: COMPLETED | OUTPATIENT
Start: 2021-02-09 | End: 2021-02-09

## 2021-02-09 RX ORDER — EPHEDRINE SULFATE 50 MG/ML
5 INJECTION, SOLUTION INTRAVENOUS ONCE AS NEEDED
Status: DISCONTINUED | OUTPATIENT
Start: 2021-02-09 | End: 2021-02-09 | Stop reason: HOSPADM

## 2021-02-09 RX ORDER — MIDAZOLAM HYDROCHLORIDE 1 MG/ML
2 INJECTION INTRAMUSCULAR; INTRAVENOUS
Status: COMPLETED | OUTPATIENT
Start: 2021-02-09 | End: 2021-02-09

## 2021-02-09 RX ORDER — LIDOCAINE HYDROCHLORIDE 10 MG/ML
3 INJECTION, SOLUTION INFILTRATION; PERINEURAL ONCE
Status: COMPLETED | OUTPATIENT
Start: 2021-02-09 | End: 2021-02-09

## 2021-02-09 RX ORDER — MIDAZOLAM HYDROCHLORIDE 1 MG/ML
1 INJECTION INTRAMUSCULAR; INTRAVENOUS
Status: COMPLETED | OUTPATIENT
Start: 2021-02-09 | End: 2021-02-09

## 2021-02-09 RX ORDER — OXYCODONE HYDROCHLORIDE 5 MG/1
5 TABLET ORAL EVERY 6 HOURS PRN
Qty: 10 TABLET | Refills: 0 | Status: SHIPPED | OUTPATIENT
Start: 2021-02-09 | End: 2021-02-24

## 2021-02-09 RX ORDER — PROMETHAZINE HYDROCHLORIDE 25 MG/1
25 SUPPOSITORY RECTAL ONCE AS NEEDED
Status: DISCONTINUED | OUTPATIENT
Start: 2021-02-09 | End: 2021-02-09 | Stop reason: HOSPADM

## 2021-02-09 RX ORDER — FENTANYL CITRATE 50 UG/ML
INJECTION, SOLUTION INTRAMUSCULAR; INTRAVENOUS AS NEEDED
Status: DISCONTINUED | OUTPATIENT
Start: 2021-02-09 | End: 2021-02-09 | Stop reason: SURG

## 2021-02-09 RX ORDER — KETOROLAC TROMETHAMINE 30 MG/ML
INJECTION, SOLUTION INTRAMUSCULAR; INTRAVENOUS AS NEEDED
Status: DISCONTINUED | OUTPATIENT
Start: 2021-02-09 | End: 2021-02-09 | Stop reason: SURG

## 2021-02-09 RX ORDER — LIDOCAINE HYDROCHLORIDE 10 MG/ML
0.5 INJECTION, SOLUTION EPIDURAL; INFILTRATION; INTRACAUDAL; PERINEURAL ONCE AS NEEDED
Status: DISCONTINUED | OUTPATIENT
Start: 2021-02-09 | End: 2021-02-09 | Stop reason: HOSPADM

## 2021-02-09 RX ORDER — ONDANSETRON 2 MG/ML
INJECTION INTRAMUSCULAR; INTRAVENOUS AS NEEDED
Status: DISCONTINUED | OUTPATIENT
Start: 2021-02-09 | End: 2021-02-09 | Stop reason: SURG

## 2021-02-09 RX ORDER — SUCCINYLCHOLINE CHLORIDE 20 MG/ML
INJECTION INTRAMUSCULAR; INTRAVENOUS AS NEEDED
Status: DISCONTINUED | OUTPATIENT
Start: 2021-02-09 | End: 2021-02-09 | Stop reason: SURG

## 2021-02-09 RX ORDER — HYDROCODONE BITARTRATE AND ACETAMINOPHEN 7.5; 325 MG/1; MG/1
1 TABLET ORAL ONCE AS NEEDED
Status: DISCONTINUED | OUTPATIENT
Start: 2021-02-09 | End: 2021-02-09 | Stop reason: HOSPADM

## 2021-02-09 RX ORDER — MAGNESIUM HYDROXIDE 1200 MG/15ML
LIQUID ORAL AS NEEDED
Status: DISCONTINUED | OUTPATIENT
Start: 2021-02-09 | End: 2021-02-09 | Stop reason: HOSPADM

## 2021-02-09 RX ORDER — SODIUM CHLORIDE 0.9 % (FLUSH) 0.9 %
3 SYRINGE (ML) INJECTION EVERY 12 HOURS SCHEDULED
Status: DISCONTINUED | OUTPATIENT
Start: 2021-02-09 | End: 2021-02-09 | Stop reason: HOSPADM

## 2021-02-09 RX ORDER — DEXAMETHASONE SODIUM PHOSPHATE 10 MG/ML
INJECTION INTRAMUSCULAR; INTRAVENOUS AS NEEDED
Status: DISCONTINUED | OUTPATIENT
Start: 2021-02-09 | End: 2021-02-09 | Stop reason: SURG

## 2021-02-09 RX ORDER — DIPHENHYDRAMINE HCL 25 MG
25 CAPSULE ORAL
Status: DISCONTINUED | OUTPATIENT
Start: 2021-02-09 | End: 2021-02-09 | Stop reason: HOSPADM

## 2021-02-09 RX ORDER — FAMOTIDINE 10 MG/ML
20 INJECTION, SOLUTION INTRAVENOUS ONCE
Status: COMPLETED | OUTPATIENT
Start: 2021-02-09 | End: 2021-02-09

## 2021-02-09 RX ORDER — PROPOFOL 10 MG/ML
VIAL (ML) INTRAVENOUS AS NEEDED
Status: DISCONTINUED | OUTPATIENT
Start: 2021-02-09 | End: 2021-02-09 | Stop reason: SURG

## 2021-02-09 RX ORDER — ACETAMINOPHEN 500 MG
1000 TABLET ORAL EVERY 8 HOURS
Qty: 30 TABLET | Refills: 0 | Status: SHIPPED | OUTPATIENT
Start: 2021-02-09 | End: 2021-02-14

## 2021-02-09 RX ORDER — LIDOCAINE HYDROCHLORIDE 20 MG/ML
INJECTION, SOLUTION INFILTRATION; PERINEURAL AS NEEDED
Status: DISCONTINUED | OUTPATIENT
Start: 2021-02-09 | End: 2021-02-09 | Stop reason: SURG

## 2021-02-09 RX ORDER — ROCURONIUM BROMIDE 10 MG/ML
INJECTION, SOLUTION INTRAVENOUS AS NEEDED
Status: DISCONTINUED | OUTPATIENT
Start: 2021-02-09 | End: 2021-02-09 | Stop reason: SURG

## 2021-02-09 RX ORDER — SODIUM CHLORIDE 0.9 % (FLUSH) 0.9 %
3-10 SYRINGE (ML) INJECTION AS NEEDED
Status: DISCONTINUED | OUTPATIENT
Start: 2021-02-09 | End: 2021-02-09 | Stop reason: HOSPADM

## 2021-02-09 RX ORDER — ONDANSETRON 2 MG/ML
4 INJECTION INTRAMUSCULAR; INTRAVENOUS ONCE AS NEEDED
Status: COMPLETED | OUTPATIENT
Start: 2021-02-09 | End: 2021-02-09

## 2021-02-09 RX ORDER — HEPARIN SODIUM 5000 [USP'U]/ML
5000 INJECTION, SOLUTION INTRAVENOUS; SUBCUTANEOUS
Status: COMPLETED | OUTPATIENT
Start: 2021-02-09 | End: 2021-02-09

## 2021-02-09 RX ORDER — SODIUM CHLORIDE, SODIUM LACTATE, POTASSIUM CHLORIDE, CALCIUM CHLORIDE 600; 310; 30; 20 MG/100ML; MG/100ML; MG/100ML; MG/100ML
100 INJECTION, SOLUTION INTRAVENOUS CONTINUOUS
Status: DISCONTINUED | OUTPATIENT
Start: 2021-02-09 | End: 2021-02-09 | Stop reason: HOSPADM

## 2021-02-09 RX ORDER — SODIUM CHLORIDE, SODIUM LACTATE, POTASSIUM CHLORIDE, CALCIUM CHLORIDE 600; 310; 30; 20 MG/100ML; MG/100ML; MG/100ML; MG/100ML
9 INJECTION, SOLUTION INTRAVENOUS CONTINUOUS
Status: DISCONTINUED | OUTPATIENT
Start: 2021-02-09 | End: 2021-02-09 | Stop reason: HOSPADM

## 2021-02-09 RX ORDER — NALOXONE HCL 0.4 MG/ML
0.2 VIAL (ML) INJECTION AS NEEDED
Status: DISCONTINUED | OUTPATIENT
Start: 2021-02-09 | End: 2021-02-09 | Stop reason: HOSPADM

## 2021-02-09 RX ORDER — LABETALOL HYDROCHLORIDE 5 MG/ML
5 INJECTION, SOLUTION INTRAVENOUS
Status: DISCONTINUED | OUTPATIENT
Start: 2021-02-09 | End: 2021-02-09 | Stop reason: HOSPADM

## 2021-02-09 RX ORDER — DIPHENHYDRAMINE HYDROCHLORIDE 50 MG/ML
12.5 INJECTION INTRAMUSCULAR; INTRAVENOUS
Status: DISCONTINUED | OUTPATIENT
Start: 2021-02-09 | End: 2021-02-09 | Stop reason: HOSPADM

## 2021-02-09 RX ORDER — PROMETHAZINE HYDROCHLORIDE 25 MG/1
25 TABLET ORAL ONCE AS NEEDED
Status: DISCONTINUED | OUTPATIENT
Start: 2021-02-09 | End: 2021-02-09 | Stop reason: HOSPADM

## 2021-02-09 RX ADMIN — SUCCINYLCHOLINE CHLORIDE 100 MG: 20 INJECTION, SOLUTION INTRAMUSCULAR; INTRAVENOUS; PARENTERAL at 11:46

## 2021-02-09 RX ADMIN — CLINDAMYCIN PHOSPHATE 900 MG: 18 INJECTION, SOLUTION INTRAMUSCULAR; INTRAVENOUS at 11:45

## 2021-02-09 RX ADMIN — LIDOCAINE HYDROCHLORIDE 3 ML: 10 INJECTION, SOLUTION INFILTRATION; PERINEURAL at 09:31

## 2021-02-09 RX ADMIN — FAMOTIDINE 20 MG: 10 INJECTION INTRAVENOUS at 10:00

## 2021-02-09 RX ADMIN — OXYCODONE HYDROCHLORIDE AND ACETAMINOPHEN 1 TABLET: 7.5; 325 TABLET ORAL at 14:56

## 2021-02-09 RX ADMIN — SODIUM CHLORIDE, POTASSIUM CHLORIDE, SODIUM LACTATE AND CALCIUM CHLORIDE 9 ML/HR: 600; 310; 30; 20 INJECTION, SOLUTION INTRAVENOUS at 10:00

## 2021-02-09 RX ADMIN — PROPOFOL 50 MCG/KG/MIN: 10 INJECTION, EMULSION INTRAVENOUS at 11:51

## 2021-02-09 RX ADMIN — DEXAMETHASONE SODIUM PHOSPHATE 6 MG: 10 INJECTION INTRAMUSCULAR; INTRAVENOUS at 11:51

## 2021-02-09 RX ADMIN — MIDAZOLAM 1 MG: 1 INJECTION INTRAMUSCULAR; INTRAVENOUS at 10:00

## 2021-02-09 RX ADMIN — FENTANYL CITRATE 50 MCG: 50 INJECTION, SOLUTION INTRAMUSCULAR; INTRAVENOUS at 14:52

## 2021-02-09 RX ADMIN — PROPOFOL 200 MG: 10 INJECTION, EMULSION INTRAVENOUS at 11:45

## 2021-02-09 RX ADMIN — MIDAZOLAM 1 MG: 1 INJECTION INTRAMUSCULAR; INTRAVENOUS at 11:26

## 2021-02-09 RX ADMIN — ONDANSETRON 4 MG: 2 INJECTION INTRAMUSCULAR; INTRAVENOUS at 14:19

## 2021-02-09 RX ADMIN — ROCURONIUM BROMIDE 10 MG: 10 INJECTION INTRAVENOUS at 11:45

## 2021-02-09 RX ADMIN — DIAZEPAM 10 MG: 5 TABLET ORAL at 08:16

## 2021-02-09 RX ADMIN — ONDANSETRON HYDROCHLORIDE 4 MG: 2 SOLUTION INTRAMUSCULAR; INTRAVENOUS at 13:06

## 2021-02-09 RX ADMIN — HEPARIN SODIUM 5000 UNITS: 5000 INJECTION INTRAVENOUS; SUBCUTANEOUS at 11:21

## 2021-02-09 RX ADMIN — FENTANYL CITRATE 100 MCG: 50 INJECTION INTRAMUSCULAR; INTRAVENOUS at 11:40

## 2021-02-09 RX ADMIN — LIDOCAINE HYDROCHLORIDE 100 MG: 20 INJECTION, SOLUTION INFILTRATION; PERINEURAL at 11:45

## 2021-02-09 RX ADMIN — KETOROLAC TROMETHAMINE 30 MG: 30 INJECTION, SOLUTION INTRAMUSCULAR; INTRAVENOUS at 13:06

## 2021-02-09 NOTE — OP NOTE
Operative Report    Patient Name:  Vivienne Barbour  YOB: 1965    Date of Surgery:  2/9/2021    Pre-op Diagnosis:   Ductal carcinoma in situ (DCIS) of left breast [D05.12]       Post-Op Diagnosis:  Ductal carcinoma in situ (DCIS) of left breast [D05.12]    Procedures:  1. Left needle-localized partial mastectomy  2. Tissue rearrangement with local tissue flaps      Staff:  Surgeon(s):  Fernanda Barbour MD         Anesthesia: General    Estimated Blood Loss: 10 mL    Implants:    Implant Name Type Inv. Item Serial No.  Lot No. LRB No. Used Action   CLIPAPPLR M/ ENDO LIGACLIP 9 3/8IN SM - [id number] Implant CLIPAPPLR M/ ENDO LIGACLIP 9 3/8IN   [employer]  DIV OF J AND J [id number] Left 1 Implanted       Specimen:          Specimens     ID Source Type Tests Collected By Collected At Frozen?      A Breast, Left Tissue · TISSUE PATHOLOGY EXAM   Fernanda Barbour MD 2/9/21 1229 No     Description: Left breast partial mastectomy- ink marks margins    Comment: Fresh for permanent    B Breast, Left Tissue · TISSUE PATHOLOGY EXAM   Fernanda Barbour MD 2/9/21 1237 No     Description: Left additional Superior , Medial, and Posterior margins- ink marks margins    Comment: Fresh for permanent            Findings: Wire and clip in good position in specimen radiograph.    Complications: None     Indications: The patient is a 55 y.o. 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 She is a good candidate for lumpectomy and she desires breast conservation. The lesion required preoperative wire-localization. The risks and benefits of surgery were discussed preoperatively and she understood and agreed to proceed.     Description of Procedure:  Preoperatively the patient was taken to the radiology department and the lesion was localized with a needle/wire. I reviewed the post-localization mammogram to determine the trajectory of the wire. The  patient was identified in the preoperative area and brought to the operating room and placed supine on the table. Patient verification was performed and general anesthesia was induced. The patient was prepped and draped in sterile fashion with the wire/needle prepped into the field. A time out was performed and all were in agreement. I confirmed that the patient had received preoperative antibiotics and chemical prophylaxis.     On the skin, I marked the suggested location of the lesion based on the mammographic localization imaging. I used this to plan my intended area of resection and incision, and marked these as well. The skin was infiltrated with local anesthesia. An upper outer curvilinear incision was made with a scalpel, and carried down through the dermis with sharp dissection. Flaps were raised according to the planned dissection. An anterior flap was raised first. Dissection was then carried out superiorly, inferiorly, medially, and laterally according to the planned area of resection. The wire was delivered into the wound. The posterior dissection was completed and the specimen removed. The specimen was oriented with paint (red - superior, green - anterior, blue - inferior, yellow - medial, orange - lateral, black - posterior). Specimen radiograph showed the wire and clip located slightly close to the superomedial aspect of the specimen, so additional margins were taken. An additional superior, medial, and posterior margin was taken as a single specimen. This was inked with the corresponding colors and ink marked the true margins. The wound was irrigated and hemostasis achieved. The remaining local anesthesia was infiltrated into the breast cavity. Marking clips were placed in the breast cavity.      Attention was then turned to reconstructing the breast. The breast deformity measured approximately 20 sq cm. The deep aspect of the breast parenchyma was dissected back for a distance of 1 cm in all  directions in order to mobilize the tissue into the defect. The parenchyma was reapproximated with interrupted 3-0 vicryl stitches. After reconstruction, there was a normal breast contour without any skin dimpling. The deep dermis was closed with interrupted 3-0 vicryl suture. The skin was closed with 4-0 subcuticular running monocryl and covered with dermabond. Counts were correct at the end of the case. The patient was awakened from anesthesia and taken to the PACU in stable condition.    Fernanda Barbour MD     Date: 2/9/2021  Time: 13:14 EST

## 2021-02-09 NOTE — ANESTHESIA PROCEDURE NOTES
Airway  Urgency: elective    Date/Time: 2/9/2021 11:47 AM  Airway not difficult    General Information and Staff    Patient location during procedure: OR  Anesthesiologist: Jason Martinez MD  CRNA: Barbara Murcia CRNA    Indications and Patient Condition  Indications for airway management: airway protection    Preoxygenated: yes  MILS maintained throughout  Mask difficulty assessment: 1 - vent by mask    Final Airway Details  Final airway type: endotracheal airway      Successful airway: ETT  Cuffed: yes   Successful intubation technique: direct laryngoscopy  Facilitating devices/methods: anterior pressure/BURP  Endotracheal tube insertion site: oral  Blade: Faby  Blade size: 3  ETT size (mm): 7.0  Cormack-Lehane Classification: grade IIa - partial view of glottis  Placement verified by: chest auscultation and capnometry   Cuff volume (mL): 5  Measured from: lips  ETT/EBT  to lips (cm): 21  Number of attempts at approach: 1  Assessment: lips, teeth, and gum same as pre-op and atraumatic intubation    Additional Comments  Atraumatic ET Tube placement.  Teeth as pre-op. BLEBS.  -ABD sounds.  +ET CO2.  Secured to face

## 2021-02-09 NOTE — ANESTHESIA POSTPROCEDURE EVALUATION
"Patient: Vivienne Barbour    Procedure Summary     Date: 02/09/21 Room / Location: [facility] OR 09 / [facility] MAIN OR    Anesthesia Start: 1135 Anesthesia Stop: 1320    Procedure: Left needle-localized partial mastectomy (Left Breast) Diagnosis:       Ductal carcinoma in situ (DCIS) of left breast      (Ductal carcinoma in situ (DCIS) of left breast [D05.12])    Surgeon: Fernanda Barbour MD Provider: Jason Martinez MD    Anesthesia Type: general ASA Status: 2          Anesthesia Type: general    Vitals  Vitals Value Taken Time   /96 02/09/21 1415   Temp 36.9 °C (98.4 °F) 02/09/21 1316   Pulse 83 02/09/21 1423   Resp 16 02/09/21 1415   SpO2 92 % 02/09/21 1423   Vitals shown include unvalidated device data.        Post Anesthesia Care and Evaluation    Patient location during evaluation: bedside  Patient participation: complete - patient participated  Level of consciousness: awake and alert  Pain management: adequate  Airway patency: patent  Anesthetic complications: No anesthetic complications    Cardiovascular status: acceptable  Respiratory status: acceptable  Hydration status: acceptable    Comments: /96   Pulse 86   Temp 36.9 °C (98.4 °F) (Oral)   Resp 16   Ht 172.7 cm (68\")   Wt 107 kg (235 lb 3.2 oz)   SpO2 94%   BMI 35.76 kg/m²       "

## 2021-02-09 NOTE — ANESTHESIA PREPROCEDURE EVALUATION
Anesthesia Evaluation     Patient summary reviewed and Nursing notes reviewed   history of anesthetic complications:  NPO Solid Status: > 8 hours  NPO Liquid Status: > 2 hours           Airway   Mallampati: II  TM distance: >3 FB  Neck ROM: full  Dental - normal exam     Pulmonary - negative pulmonary ROS and normal exam   Cardiovascular - negative cardio ROS and normal exam        Neuro/Psych  (+) headaches, psychiatric history Anxiety and Depression,     GI/Hepatic/Renal/Endo    (+) obesity,  GERD,      Musculoskeletal     Abdominal    Substance History - negative use     OB/GYN negative ob/gyn ROS         Other   arthritis,    history of cancer                  Anesthesia Plan    ASA 2     general       Anesthetic plan, all risks, benefits, and alternatives have been provided, discussed and informed consent has been obtained with: patient.

## 2021-02-10 LAB
CYTO UR: NORMAL
LAB AP CASE REPORT: NORMAL
LAB AP CLINICAL INFORMATION: NORMAL
LAB AP DIAGNOSIS COMMENT: NORMAL
PATH REPORT.FINAL DX SPEC: NORMAL
PATH REPORT.GROSS SPEC: NORMAL

## 2021-02-12 ENCOUNTER — TELEPHONE (OUTPATIENT)
Dept: SURGERY | Facility: CLINIC | Age: 56
End: 2021-02-12

## 2021-02-12 DIAGNOSIS — D05.12 DUCTAL CARCINOMA IN SITU (DCIS) OF LEFT BREAST: Primary | ICD-10-CM

## 2021-02-12 NOTE — TELEPHONE ENCOUNTER
I called Ms. Barbour to discuss her pathology report. She is recovering well from surgery. I will place referrals for med/onc and rad/onc.     Fernanda Barbour MD

## 2021-02-24 ENCOUNTER — APPOINTMENT (OUTPATIENT)
Dept: LAB | Facility: HOSPITAL | Age: 56
End: 2021-02-24

## 2021-02-24 ENCOUNTER — OFFICE VISIT (OUTPATIENT)
Dept: SURGERY | Facility: CLINIC | Age: 56
End: 2021-02-24

## 2021-02-24 VITALS
WEIGHT: 235 LBS | RESPIRATION RATE: 18 BRPM | HEART RATE: 83 BPM | DIASTOLIC BLOOD PRESSURE: 81 MMHG | SYSTOLIC BLOOD PRESSURE: 145 MMHG | OXYGEN SATURATION: 98 % | HEIGHT: 68 IN | BODY MASS INDEX: 35.61 KG/M2

## 2021-02-24 DIAGNOSIS — Z48.89 POSTOPERATIVE VISIT: Primary | ICD-10-CM

## 2021-02-24 DIAGNOSIS — D05.12 DUCTAL CARCINOMA IN SITU (DCIS) OF LEFT BREAST: ICD-10-CM

## 2021-02-24 PROCEDURE — 99024 POSTOP FOLLOW-UP VISIT: CPT | Performed by: SURGERY

## 2021-02-24 RX ORDER — TOPIRAMATE 50 MG/1
50 TABLET, FILM COATED ORAL DAILY
COMMUNITY
Start: 2021-02-03 | End: 2021-03-05

## 2021-02-24 RX ORDER — FAMOTIDINE 40 MG/1
40 TABLET, FILM COATED ORAL
COMMUNITY
Start: 2021-02-03 | End: 2022-02-03

## 2021-02-24 NOTE — PROGRESS NOTES
[facility]     Referring Provider: Nikolay Hernandez MD     Chief complaint: Postoperative visit     HPI:   1/18/21:  Ms. Vivienne Barbour is a 56 yo woman, seen at the request of Dr. 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 This was initially detected as an imaging abnormality on routine screening. Her work-up is detailed in the oncologic history below. Prior to the biopsy, she denies any breast lumps, pain, skin changes, or nipple discharge. She denies any prior history of abnormal mammograms or breast biopsies. She has a family history of breast cancer in her paternal grandmother (diagnosed at age 88). She denies any family history of ovarian cancer.      2/24/21, Interval History:  She underwent left partial mastectomy on 2/9/21. See surgery & pathology details below in oncologic history. She has been recovering well and has no complaints.        Oncology/Hematology History Overview Note   11/07/18, Screening MMG with [facility] ([facility]):  Scattered areas of fibroglandular density. There is a stable oval mass measuring 15 millimeters seen in the posterior one-third region of the right breast at 10 o'clock. No suspicious masses, suspicious microcalcifications or new areas of architectural distortion are identified. There has been no significant change from the prior exam(s). In the left breast, no suspicious masses, significant calcifications or other abnormalities are seen.  BI-RADS 2: Benign.    11/15/19, Screening MMG with [facility] ([facility]):  Scattered areas of fibroglandular density. There is a focal asymmetry seen in the posterior one-third upper outer region of the left breast. In the right breast, no suspicious masses, significant calcifications or other abnormalities are seen.  BI-RADS 0: Incomplete.    12/17/19, Left Diagnostic MMG with [facility] & Left Breast US ([facility]):  MMG:  On the present examination, focal asymmetry in the left breast, upper outer does not  persist. With all diagnostic views, this area is not seen. No suspicious mammographic finding is seen.  US:  No suspicious sonographic finding is seen. A small incidental simple cyst with a thin smooth internal septations is present at the 1:30 location, 2 cm from the nipple. This is not the mammographic area which was evaluated and is a small incidental benign simple cyst.  BI-RADS 1: Negative.     Ductal carcinoma in situ (DCIS) of left breast   12/10/2020 Initial Diagnosis    Ductal carcinoma in situ (DCIS) of left breast     12/11/2020 Imaging    Screening MMG with [facility] ([facility]):  Scattered areas of fibroglandular density.  1. There are a few new punctate calcifications with grouped distribution seen in the middle one-third 1:30 o'clock region of the left breast.  2. There is a stable oval mass measuring 15 mm seen in the posterior one-third 10:30 o'clock region of the right breast. No suspicious masses, suspicious microcalcifications or new areas of architectural distortion are identified. There has been no significant change from the prior exam(s).  BI-RADS 0: Incomplete.     1/4/2021 Imaging    Left Diagnostic MMG with [facility] ([facility]):  On the present examination, there are new amorphous and indistinct calcifications measuring 4 mm with grouped distribution in the middle one-third 1:30 o'clock region of the left breast located 10 centimeters from the nipple. Additional imaging demonstrates microcalcifications are suspicious and tissue sampling is recommended.  Bi-RADS 4B: Suspicious.     1/11/2021 Biopsy    Left Breast, Stereotactic Biopsy ([facility]):    Breast, Left 1:30 O'clock, Core Needle Biopsy:  Ductal Carcinoma in Situ (DCIS), High Nuclear Grade, Comedo Pattern, 3 mm in Greatest Dimension, Present in 3 of 13 Cores.   Microcalcifications Associated with DCIS and Benign Mammary Ducts/Lobules.    ER negative (0.29%)  WV negative (0%)  Ki-67 32.68%    -Tophat clip. No residual calcifications were  visible on post procedure mammogram.     1/15/2021 Imaging    Bilateral Breast MRI ([facility]):  RIGHT BREAST:    At 10:00 in the posterior right breast, approximately 9 cm posterior to the nipple, there is a 1.4 cm AP dimension, 0.9 cm transverse dimension, 0.9 cm craniocaudal dimension oval enhancing mass, which is mammographically stable dating back to at least 2016. No suspicious enhancing mass or area of non-mass enhancement is identified. The visualized axilla is within normal limits.    LEFT BREAST:    At 1:00, approximately 6 cm posterior to the nipple, there is an approximately 1.2 x 0.7 x 3.1 cm biopsy cavity/tract with a central focus of susceptibility from a biopsy clip, which marks the site of  biopsy-proven malignancy. There is subtle enhancement extending from the biopsy site to the overlying skin, which is likely postbiopsy in nature. No suspicious mass or mass enhancement is identified at this location or elsewhere within the left breast. No suspicious enhancement is identified in the left nipple or chest wall. The visualized axilla is within normal limits.   EXTRAMAMMARY FINDINGS:   There are no abnormally enlarged internal mammary chain lymph nodes on either side.  BI-RADS 6: Known malignancy.     1/19/2021 Genetic Testing    [employer] Common Hereditary Cancers Panel (47 genes):    VUS in HOXB13     2/9/2021 Surgery    Left needle-localized partial mastectomy    1. Left Breast, Oriented Needle Localization Partial Mastectomy (29 grams):               A. Benign breast tissue with sclerosing adenosis, fibrocystic change and usual ductal hyperplasia with associated       calcifications (see Comment).  B. Biopsy site identified and clip retrieved.  C. No atypical hyperplasia, in situ nor invasive carcinoma identified (margins clear).     2. Left Breast, Additional Superior, Medial and Posterior Margins, Oriented Excision:               A. Benign fatty breast tissue with focal columnar cell hyperplasia.                 B. No atypical hyperplasia, in situ nor invasive carcinoma identified (margins clear).         Review of Systems:  See interval history.       Medications:    Current Outpatient Medications:   •  famotidine (PEPCID) 40 MG tablet, Take 40 mg by mouth., Disp: , Rfl:   •  topiramate (TOPAMAX) 50 MG tablet, Take 50 mg by mouth Daily., Disp: , Rfl:   •  acyclovir (ZOVIRAX) 400 MG tablet, Take 400 mg by mouth As Needed. Take no more than 5 doses a day. , Disp: , Rfl:   •  aspirin 81 MG tablet, Take 81 mg by mouth Daily. HELD FOR OR, Disp: , Rfl:   •  buPROPion SR (WELLBUTRIN SR) 200 MG 12 hr tablet, Take 150 mg by mouth Daily., Disp: , Rfl:   •  Cholecalciferol (VITAMIN D3) 2000 units tablet, Take 2,000 Units by mouth Daily. HELD FOR OR, Disp: , Rfl:   •  cyclobenzaprine (FLEXERIL) 5 MG tablet, Take 5 mg by mouth 3 (Three) Times a Day As Needed for Muscle Spasms., Disp: , Rfl:   •  docusate sodium (COLACE) 250 MG capsule, Take 250 mg by mouth Daily., Disp: , Rfl:   •  escitalopram (LEXAPRO) 20 MG tablet, Take 20 mg by mouth Daily., Disp: , Rfl:   •  meloxicam (MOBIC) 15 MG tablet, Take 15 mg by mouth Daily. HELD FOR OR, Disp: , Rfl:   •  omeprazole (priLOSEC) 20 MG capsule, Take 20 mg by mouth Daily., Disp: , Rfl:   •  PRENATAL 28-0.8 MG tablet, Take 1 tablet by mouth Daily. HELD FOR OR, Disp: , Rfl:   •  SUMAtriptan (IMITREX) 50 MG tablet, Take 50 mg by mouth Every 2 (Two) Hours As Needed for Migraine. Take one tablet at onset of headache. May repeat dose one time in 2 hours if headache not relieved., Disp: , Rfl:   •  TURMERIC CURCUMIN PO, Take  by mouth Daily. HELD FOR OR, Disp: , Rfl:   •  uribel (URO-MP) 118 MG capsule capsule, Take 1 capsule by mouth As Needed., Disp: , Rfl:   •  vitamin D (ERGOCALCIFEROL) 1.25 MG (09359 UT) capsule capsule, Take 50,000 Units by mouth Every 30 (Thirty) Days., Disp: , Rfl:       Allergies   Allergen Reactions   • Vilazodone Hcl Other (See Comments)     hallucinations   •  Morphine Itching   • Penicillins Rash   • Sulfa Antibiotics Rash   • Thimerosal Itching       Family History   Problem Relation Age of Onset   • Hypertension Mother    • Colon polyps Mother         sigmoid colon   • Thyroid cancer Mother    • Heart disease Mother    • Benign prostatic hyperplasia Father    • Cancer Father         bladder   • Colon polyps Father         sigmoid colon   • Hypertension Father    • Colon polyps Brother         sigmoid colon   • Hypertension Brother    • Colon cancer Maternal Grandmother    • Colon cancer Maternal Grandfather    • Heart disease Maternal Grandfather    • Heart attack Maternal Grandfather    • Colon cancer Paternal Uncle    • Heart disease Maternal Uncle    • Stroke Paternal Grandmother    • Breast cancer Paternal Grandmother 88   • Diabetes Paternal Grandfather    • Hypertension Brother    • Malig Hyperthermia Neg Hx        PHYSICAL EXAMINATION:   Vitals:    02/24/21 1538   BP: 145/81   Pulse: 83   Resp: 18   SpO2: 98%     ECOG 0 - Asymptomatic  General: NAD, well appearing  Psych: a&o x 3, normal mood and affect  Eyes: EOMI, no scleral icterus  ENMT: neck supple without masses or thyromegaly, mucus membranes moist  MSK: normal gait, normal ROM in bilateral shoulders  Lymph nodes: no cervical, supraclavicular or axillary lymphadenopathy  Breast: symmetric, large size, grade 3 ptosis  Right: deferred.  Left: Well-healing upper outer curvilinear incision with Dermabond intact. No concavity or fullness at the surgical site.      Assessment:  55 y.o. 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 She is sp left partial mastectomy on 2/9/21, with no residual disease found in the surgical specimen, 3 mm on core, pTis.    Plan:  -Medical oncology referral. Appointment scheduled with Dr. Olvera on 3/1/21.  -Radiation oncology referral. Appointment scheduled with Dr. Davis on 3/8/21.  -F/u in 4 months for CBE.  -She was instructed  to call sooner with any questions, concerns or changes on BSE.    Fernanda Barbour MD      CC:  MD Tala Russo DO Ann Grider, MD

## 2021-02-25 ENCOUNTER — TELEPHONE (OUTPATIENT)
Dept: SURGERY | Facility: CLINIC | Age: 56
End: 2021-02-25

## 2021-03-01 ENCOUNTER — CONSULT (OUTPATIENT)
Dept: ONCOLOGY | Facility: CLINIC | Age: 56
End: 2021-03-01

## 2021-03-01 ENCOUNTER — LAB (OUTPATIENT)
Dept: LAB | Facility: HOSPITAL | Age: 56
End: 2021-03-01

## 2021-03-01 VITALS
HEART RATE: 76 BPM | RESPIRATION RATE: 16 BRPM | SYSTOLIC BLOOD PRESSURE: 134 MMHG | DIASTOLIC BLOOD PRESSURE: 80 MMHG | BODY MASS INDEX: 35.49 KG/M2 | TEMPERATURE: 98.2 F | HEIGHT: 68 IN | OXYGEN SATURATION: 97 % | WEIGHT: 234.2 LBS

## 2021-03-01 DIAGNOSIS — D05.12 DUCTAL CARCINOMA IN SITU (DCIS) OF LEFT BREAST: Primary | ICD-10-CM

## 2021-03-01 LAB
BASOPHILS # BLD AUTO: 0.04 10*3/MM3 (ref 0–0.2)
BASOPHILS NFR BLD AUTO: 0.6 % (ref 0–1.5)
DEPRECATED RDW RBC AUTO: 41.4 FL (ref 37–54)
EOSINOPHIL # BLD AUTO: 0.24 10*3/MM3 (ref 0–0.4)
EOSINOPHIL NFR BLD AUTO: 3.6 % (ref 0.3–6.2)
ERYTHROCYTE [DISTWIDTH] IN BLOOD BY AUTOMATED COUNT: 14.3 % (ref 12.3–15.4)
HCT VFR BLD AUTO: 44.2 % (ref 34–46.6)
HGB BLD-MCNC: 14.5 G/DL (ref 12–15.9)
IMM GRANULOCYTES # BLD AUTO: 0.05 10*3/MM3 (ref 0–0.05)
IMM GRANULOCYTES NFR BLD AUTO: 0.7 % (ref 0–0.5)
LYMPHOCYTES # BLD AUTO: 2.09 10*3/MM3 (ref 0.7–3.1)
LYMPHOCYTES NFR BLD AUTO: 31.2 % (ref 19.6–45.3)
MCH RBC QN AUTO: 26.8 PG (ref 26.6–33)
MCHC RBC AUTO-ENTMCNC: 32.8 G/DL (ref 31.5–35.7)
MCV RBC AUTO: 81.7 FL (ref 79–97)
MONOCYTES # BLD AUTO: 0.73 10*3/MM3 (ref 0.1–0.9)
MONOCYTES NFR BLD AUTO: 10.9 % (ref 5–12)
NEUTROPHILS NFR BLD AUTO: 3.55 10*3/MM3 (ref 1.7–7)
NEUTROPHILS NFR BLD AUTO: 53 % (ref 42.7–76)
NRBC BLD AUTO-RTO: 0 /100 WBC (ref 0–0.2)
PLATELET # BLD AUTO: 240 10*3/MM3 (ref 140–450)
PMV BLD AUTO: 9.6 FL (ref 6–12)
RBC # BLD AUTO: 5.41 10*6/MM3 (ref 3.77–5.28)
WBC # BLD AUTO: 6.7 10*3/MM3 (ref 3.4–10.8)

## 2021-03-01 PROCEDURE — 99244 OFF/OP CNSLTJ NEW/EST MOD 40: CPT | Performed by: INTERNAL MEDICINE

## 2021-03-01 PROCEDURE — 85025 COMPLETE CBC W/AUTO DIFF WBC: CPT

## 2021-03-01 PROCEDURE — 36415 COLL VENOUS BLD VENIPUNCTURE: CPT

## 2021-03-01 NOTE — PROGRESS NOTES
Subjective     REASON FOR CONSULTATION: Left breast DCIS high-grade ER/MO negative post lumpectomy    Provide an opinion on any further workup or treatment                             REQUESTING PHYSICIAN: MD Shy Salmeron DO    RECORDS OBTAINED:  Records of the patients history including those obtained from the referring provider were reviewed and summarized in detail.    HISTORY OF PRESENT ILLNESS:  The patient is a 55 y.o. year old female who is here for an opinion about the above issue.    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  This led to a diagnostic mammogram and biopsy On 2021 that showed DCIS high-grade 3 mm in greatest dimension with microcalcifications ER/MO negative  Patient was referred to Dr. Barbour and underwent bilateral breast MRIs Which showed a stable lesion in the right breast measuring 1.4 x 1 cm seen since  and a 1.2 x 3 cm biopsy cavity with no residual suspicious enhancement    Patient underwent lumpectomy On 2021 with the final specimen showing no residual DCIS or atypia and she is referred here for prevention    She is  3 para 2 with 1 miscarriage menarche was at age 14 she had a hysterectomy in  but is having hot flashes and is likely postmenopausal first childbirth was at age 35 she breast-fed her first child for a year second child for 6 weeks she has been on no hormones after menopause.    Family history is positive for mother with oral cancer related to smoking and father with bladder cancer both were over 75 she has a paternal grandmother with possible breast cancer at age 85 although no treatment was given patient had genetic testing which  showed a V US in the  13 gene which is associated with prostate cancer    She is not a smoker or drinker  She is on Lexapro for depression  No history of DVT MI or stroke but she does have osteopenia and femoral necks bilaterally on her last DEXA scan in early 2020      Past Medical History:   Diagnosis Date   • Acid reflux disease    • Allergic rhinitis    • Anxiety and depression    • Arthritis    • Bladder spasm    • Breast cancer, left (CMS/HCC)    • History of 2019 novel coronavirus disease (COVID-19)     12/2020 ASYMPTOMATIC    • Low back pain    • Migraines    • Osteopenia    • Palpitation     ECHO DONE 2019 - NO FOLLOW UP REQUIRED   • Polyp of sigmoid colon    • PONV (postoperative nausea and vomiting)    • Post-menopausal    • Vitamin D deficiency         Past Surgical History:   Procedure Laterality Date   • BREAST BIOPSY     • BREAST LUMPECTOMY Left 2/9/2021    Procedure: Left needle-localized partial mastectomy;  Surgeon: Fernanda Barbour MD;  Location: [facility];  Service: General;  Laterality: Left;   • COLONOSCOPY  05/2015    Dr. Ramires   • CYSTOSCOPY      diagnostic, Dr. Dominique [facility]   • DIAGNOSTIC LAPAROSCOPY  1991   • HYSTERECTOMY  2006    bladder track, recrocele/cystocele/endometriosis   • KNEE ARTHROSCOPY Left         Current Outpatient Medications on File Prior to Visit   Medication Sig Dispense Refill   • acyclovir (ZOVIRAX) 400 MG tablet Take 400 mg by mouth As Needed. Take no more than 5 doses a day.      • aspirin 81 MG tablet Take 81 mg by mouth Daily. HELD FOR OR     • buPROPion SR (WELLBUTRIN SR) 200 MG 12 hr tablet Take 150 mg by mouth Daily.     • Cholecalciferol (VITAMIN D3) 2000 units tablet Take 2,000 Units by mouth Daily. HELD FOR OR     • cyclobenzaprine (FLEXERIL) 5 MG tablet Take 5 mg by mouth 3 (Three) Times a Day As Needed for Muscle Spasms.     • docusate sodium (COLACE) 250 MG capsule Take 250 mg by mouth Daily.     • escitalopram (LEXAPRO) 20 MG tablet Take 20 mg  by mouth Daily.     • famotidine (PEPCID) 40 MG tablet Take 40 mg by mouth.     • melicam (MOBIC) 15 MG tablet Take 15 mg by mouth Daily. HELD FOR OR     • omeprazole (priLOSEC) 20 MG capsule Take 20 mg by mouth Daily.     • PRENATAL 28-0.8 MG tablet Take 1 tablet by mouth Daily. HELD FOR OR     • SUMAtriptan (IMITREX) 50 MG tablet Take 50 mg by mouth Every 2 (Two) Hours As Needed for Migraine. Take one tablet at onset of headache. May repeat dose one time in 2 hours if headache not relieved.     • topiramate (TOPAMAX) 50 MG tablet Take 50 mg by mouth Daily.     • TURMERIC CURCUMIN PO Take  by mouth Daily. HELD FOR OR     • uribel (URO-MP) 118 MG capsule capsule Take 1 capsule by mouth As Needed.     • vitamin D (ERGOCALCIFEROL) 1.25 MG (27864 UT) capsule capsule Take 50,000 Units by mouth Every 30 (Thirty) Days.       No current facility-administered medications on file prior to visit.         ALLERGIES:    Allergies   Allergen Reactions   • Vilazodone Hcl Hallucinations     hallucinations   • Morphine Itching   • Penicillins Rash   • Sulfa Antibiotics Rash   • Thimerosal Itching        Social History     Socioeconomic History   • Marital status:      Spouse name: Not on file   • Number of children: 2   • Years of education: Not on file   • Highest education level: Not on file   Occupational History   • Occupation: RN, [employer]   Tobacco Use   • Smoking status: Never Smoker   • Smokeless tobacco: Never Used   Substance and Sexual Activity   • Alcohol use: Yes     Comment: RARE   • Drug use: Never   • Sexual activity: Defer   Social History Narrative    1 daughter, 1 son         Family History   Problem Relation Age of Onset   • Hypertension Mother    • Colon polyps Mother         sigmoid colon   • Thyroid cancer Mother    • Heart disease Mother    • Benign prostatic hyperplasia Father    • Cancer Father         bladder   • Colon polyps Father         sigmoid colon   • Hypertension Father    • Colon polyps  "Brother         sigmoid colon   • Hypertension Brother    • Colon cancer Maternal Grandmother    • Colon cancer Maternal Grandfather    • Heart disease Maternal Grandfather    • Heart attack Maternal Grandfather    • Colon cancer Paternal Uncle    • Heart disease Maternal Uncle    • Stroke Paternal Grandmother    • Breast cancer Paternal Grandmother 88   • Diabetes Paternal Grandfather    • Hypertension Brother    • Malig Hyperthermia Neg Hx         Review of Systems   Constitutional: Positive for diaphoresis.   HENT: Positive for hearing loss.    Eyes: Positive for visual disturbance (Double vision after trauma to her eye).   Endocrine: Positive for heat intolerance.   Musculoskeletal: Positive for gait problem (Recurrent falls due to bad balance).   Hematological: Bruises/bleeds easily.        Objective     Vitals:    03/01/21 1231   BP: 134/80   Pulse: 76   Resp: 16   Temp: 98.2 °F (36.8 °C)   TempSrc: Temporal   SpO2: 97%   Weight: 106 kg (234 lb 3.2 oz)   Height: 173 cm (68.11\")  Comment: new ht without shoes   PainSc: 0-No pain     Current Status 3/1/2021   ECOG score 0       Physical Exam   CONSTITUTIONAL:  Vital signs reviewed.  No distress, looks comfortable.  EYES:  Conjunctiva and lids unremarkable.  PERRLA  EARS,NOSE,MOUTH,THROAT:  Ears and nose appear unremarkable.  Lips, teeth, gums appear unremarkable.  RESPIRATORY:  Normal respiratory effort.  Lungs clear to auscultation bilaterally.  BREASTS: Right breast benign left breast shows a well-healed lumpectomy scar in the upper outer quadrant  CARDIOVASCULAR:  Normal S1, S2.  No murmurs rubs or gallops.  No significant lower extremity edema.  GASTROINTESTINAL: Abdomen appears unremarkable.  Nontender.  No hepatomegaly.  No splenomegaly.  LYMPHATIC:  No cervical, supraclavicular, axillary lymphadenopathy.  SKIN:  Warm.  No rashes.  PSYCHIATRIC:  Normal judgment and insight.  Normal mood and affect.      RECENT LABS:  Hematology WBC   Date Value Ref Range " Status   03/01/2021 6.70 3.40 - 10.80 10*3/mm3 Final     RBC   Date Value Ref Range Status   03/01/2021 5.41 (H) 3.77 - 5.28 10*6/mm3 Final     Hemoglobin   Date Value Ref Range Status   03/01/2021 14.5 12.0 - 15.9 g/dL Final     Hematocrit   Date Value Ref Range Status   03/01/2021 44.2 34.0 - 46.6 % Final     Platelets   Date Value Ref Range Status   03/01/2021 240 140 - 450 10*3/mm3 Final          Assessment/Plan   1.pTis Nx high-grade ER/LA negative post lumpectomy    2.  Arnold-Chiari malformation    3.  Depression    4. Migraines    5.  Strong family history of colon cancer.  Patient has multiple polyps -empiric breast cancer genetic testing shows a Hox 13 VUS -we will refer back to genetics to make sure all the GI malignancy panel has been covered    6.  Moderate osteopenia both hips    Plan  1.  Refer to genetics to make sure that her colon cancer genes have been completely tested  2.  Radiation appointment  3.  Return in 6 months we will review her bone density and make a decision about Evista for bone density with some possible benefit also if she has a hormone positive DCIS or malignancy      Plan

## 2021-03-03 RX ORDER — VENLAFAXINE 25 MG/1
TABLET ORAL
Qty: 30 TABLET | Refills: 1 | Status: CANCELLED | OUTPATIENT
Start: 2021-03-03

## 2021-03-03 NOTE — TELEPHONE ENCOUNTER
Called pt re: medication for her hot flashes that she had inquired about. While we were planing to prescribe effexor, pt is already taking lexapro. Discussed our recommendation to switch from lexapro to effexor and she will reach out to her PCP Dr. Yip to request this. She is also asking about receiving the COVID vaccine and I told her that Dr. Olvera recommends that she get this when it is available to her. She v/u.

## 2021-03-04 ENCOUNTER — TELEPHONE (OUTPATIENT)
Dept: ONCOLOGY | Facility: CLINIC | Age: 56
End: 2021-03-04

## 2021-03-04 NOTE — TELEPHONE ENCOUNTER
CSW contacted the patient to follow up on a DQ score of 4 and to assess support/resource needs.  CSW left a message for the patient with contact information.  A letter of SW introduction has also been sent to the patient.

## 2021-03-08 ENCOUNTER — APPOINTMENT (OUTPATIENT)
Dept: RADIATION ONCOLOGY | Facility: HOSPITAL | Age: 56
End: 2021-03-08

## 2021-03-08 ENCOUNTER — CONSULT (OUTPATIENT)
Dept: RADIATION ONCOLOGY | Facility: HOSPITAL | Age: 56
End: 2021-03-08

## 2021-03-08 VITALS
BODY MASS INDEX: 35.46 KG/M2 | DIASTOLIC BLOOD PRESSURE: 84 MMHG | OXYGEN SATURATION: 98 % | HEART RATE: 71 BPM | SYSTOLIC BLOOD PRESSURE: 127 MMHG | TEMPERATURE: 97.5 F | WEIGHT: 234 LBS

## 2021-03-08 DIAGNOSIS — D05.12 DUCTAL CARCINOMA IN SITU (DCIS) OF LEFT BREAST: Primary | ICD-10-CM

## 2021-03-08 PROCEDURE — 77290 THER RAD SIMULAJ FIELD CPLX: CPT | Performed by: RADIOLOGY

## 2021-03-08 PROCEDURE — 77333 RADIATION TREATMENT AID(S): CPT | Performed by: RADIOLOGY

## 2021-03-08 PROCEDURE — 99205 OFFICE O/P NEW HI 60 MIN: CPT | Performed by: RADIOLOGY

## 2021-03-08 PROCEDURE — 77263 THER RADIOLOGY TX PLNG CPLX: CPT | Performed by: RADIOLOGY

## 2021-03-08 NOTE — PROGRESS NOTES
Subjective     Fernanda Barbour MD    Cancer Staging  No stage assigned  Stage Unknown (pTis (DCIS), pNX, G2, ER-, IN-)    Chief Complaint   Patient presents with   • Consult     Breast CA      CC:DCIS left breast                              Dear Fernanda Barbour MD    I had the pleasure of seeing Vivienne Barbour  today in the [facility].   The patient is a 56 y.o. female with recently diagnosed left breast DCIS.  She had a bilateral screening mammogram on 20 which showed new calcifications in left breast, birads 0.  She had a diagnostic left mammogram on 21 which showed suspicious new calcs at 1:30, 10cm from the nipple. She had a stereotactic biopsy on 21 with pathology revealing high grade DCIS, comedo pattern, 3mm in size, ER 0.29% IN 0% and ki67 32%.      She had a breast mri on 1/15/21 which showed a 1.2cm biopsy cavity left breast 1 oclock, 6cm from the nipple.  She underwent a left needle localized lumpectomy and tissue rearrangement with local tissue flaps on 21.  Her pathology revealed no residual invasive or insitu disease, only benign breast tissue with biopsy clip identified.     She met with Dr. Olvera on 3/1/21 with no recommendations for hormonal blockade at this time.     She is  with menarche age 14 and first childbirth age 35.  She breast fed for one year.  She had a hysterectomy in .  She had invitae genetic testing which showed VUS [id number].  Her paternal grandmother was diagnosed with breast cancer in her 80s.          Review of Systems   Constitutional: Positive for diaphoresis.   HENT: Positive for hearing loss.    Eyes: Negative.    Cardiovascular: Positive for palpitations.   Gastrointestinal: Negative.    Genitourinary: Negative.    Musculoskeletal: Positive for arthralgias.   Skin: Negative.    Neurological: Positive for headaches.   Hematological: Negative.    Psychiatric/Behavioral: Positive for dysphoric mood.         Past Medical History:    Diagnosis Date   • Acid reflux disease    • Allergic rhinitis    • Anxiety and depression    • Arthritis    • Bladder spasm    • Breast cancer, left (CMS/HCC)    • History of 2019 novel coronavirus disease (COVID-19)     12/2020 ASYMPTOMATIC    • Low back pain    • Migraines    • Osteopenia    • Palpitation     ECHO DONE 2019 - NO FOLLOW UP REQUIRED   • Polyp of sigmoid colon    • PONV (postoperative nausea and vomiting)    • Post-menopausal    • Vitamin D deficiency          Past Surgical History:   Procedure Laterality Date   • BREAST BIOPSY     • BREAST LUMPECTOMY Left 2/9/2021    Procedure: Left needle-localized partial mastectomy;  Surgeon: Fernanda Barbour MD;  Location: [facility] OR;  Service: General;  Laterality: Left;   • COLONOSCOPY  05/2015    Dr. Ramires   • CYSTOSCOPY      diagnostic, Dr. Dominique [facility]   • DIAGNOSTIC LAPAROSCOPY  1991   • HYSTERECTOMY  2006    bladder track, recrocele/cystocele/endometriosis   • KNEE ARTHROSCOPY Left          Social History     Socioeconomic History   • Marital status:      Spouse name: Not on file   • Number of children: 2   • Years of education: Not on file   • Highest education level: Not on file   Tobacco Use   • Smoking status: Never Smoker   • Smokeless tobacco: Never Used   Vaping Use   • Vaping Use: Never used   Substance and Sexual Activity   • Alcohol use: Yes     Comment: RARE   • Drug use: Never   • Sexual activity: Defer         Family History   Problem Relation Age of Onset   • Hypertension Mother    • Colon polyps Mother         sigmoid colon   • Thyroid cancer Mother    • Heart disease Mother    • Benign prostatic hyperplasia Father    • Cancer Father         bladder   • Colon polyps Father         sigmoid colon   • Hypertension Father    • Colon polyps Brother         sigmoid colon   • Hypertension Brother    • Colon cancer Maternal Grandmother    • Colon cancer Maternal Grandfather    • Heart disease Maternal Grandfather    • Heart attack  Maternal Grandfather    • Colon cancer Paternal Uncle    • Heart disease Maternal Uncle    • Stroke Paternal Grandmother    • Breast cancer Paternal Grandmother 88   • Diabetes Paternal Grandfather    • Hypertension Brother    • Malig Hyperthermia Neg Hx           Objective    Physical Exam      Current Outpatient Medications on File Prior to Visit   Medication Sig Dispense Refill   • acyclovir (ZOVIRAX) 400 MG tablet Take 400 mg by mouth As Needed. Take no more than 5 doses a day.      • aspirin 81 MG tablet Take 81 mg by mouth Daily. HELD FOR OR     • buPROPion SR (WELLBUTRIN SR) 200 MG 12 hr tablet Take 150 mg by mouth Daily.     • Cholecalciferol (VITAMIN D3) 2000 units tablet Take 2,000 Units by mouth Daily. HELD FOR OR     • cyclobenzaprine (FLEXERIL) 5 MG tablet Take 5 mg by mouth 3 (Three) Times a Day As Needed for Muscle Spasms.     • docusate sodium (COLACE) 250 MG capsule Take 250 mg by mouth Daily.     • escitalopram (LEXAPRO) 20 MG tablet Take 20 mg by mouth Daily.     • famotidine (PEPCID) 40 MG tablet Take 40 mg by mouth.     • meloxicam (MOBIC) 15 MG tablet Take 15 mg by mouth Daily. HELD FOR OR     • omeprazole (priLOSEC) 20 MG capsule Take 20 mg by mouth Daily.     • PRENATAL 28-0.8 MG tablet Take 1 tablet by mouth Daily. HELD FOR OR     • SUMAtriptan (IMITREX) 50 MG tablet Take 50 mg by mouth Every 2 (Two) Hours As Needed for Migraine. Take one tablet at onset of headache. May repeat dose one time in 2 hours if headache not relieved.     • TURMERIC CURCUMIN PO Take  by mouth Daily. HELD FOR OR     • uribel (URO-MP) 118 MG capsule capsule Take 1 capsule by mouth As Needed.     • vitamin D (ERGOCALCIFEROL) 1.25 MG (52348 UT) capsule capsule Take 50,000 Units by mouth Every 30 (Thirty) Days.       No current facility-administered medications on file prior to visit.       ALLERGIES:    Allergies   Allergen Reactions   • Vilazodone Hcl Hallucinations     hallucinations   • Morphine Itching   •  Penicillins Rash   • Sulfa Antibiotics Rash   • Thimerosal Itching       There were no vitals taken for this visit.     Current Status 3/1/2021   ECOG score 0         Assessment/Plan     56 year old female with 3mm high grade DCIS ER TN negative with no residual disease at lumpectomy, I discussed with her my recommendation for post-operative radiation therapy to the left breast to decrease the risk of local recurrence.      I discussed with her in detail the risks, benefits and rationale of radiation therapy to the left breast to include but not limited to the following:    Acute: skin erythema, breakdown/moist desquamation, swelling or discomfort of the breast, fatigue, pneumonitis resulting in shortness of breath, cough or pain    Late: Permanent skin changes including hyperpigmentation, telangiectasias, fibrosis of the breast resulting in smaller size or poor cosmetic outcome, late edema or cellulitis, late rib fracture, late pulmonary fibrosis and the remote risk of late cardiac damage resulting in increased risk of heart attack or second malignancies.      She voiced understanding and was given an opportunity to ask questions which I believe were answered to her satisfaction.  We will proceed with CT simulation for treatment planning today in anticipation of starting her treatments next week. I plan to treat the right breast with tangential fields to a dose of approximately 4256cGy in 16 fractions with no boost to the tumor bed.    I personally spent greater than 60 minutes today assessing, managing, discussing and documenting my visit with the patient. That time includes review of records, imaging and pathology reports, obtaining my own history, performing a medically appropriate evaluation, counseling and educating the patient, discussing goals, logistics, alternatives and risks of my recommendations, surveillance options and potential outcomes. It also includes the time documenting the clinical information  in the EMR and communicating my recommendations to the other involved physicians.                 Thank you very much for allowing me to participate in the care of this very pleasant patient.    Sincerely,      Yary Davis MD

## 2021-03-10 PROCEDURE — 77295 3-D RADIOTHERAPY PLAN: CPT | Performed by: RADIOLOGY

## 2021-03-10 PROCEDURE — 77293 RESPIRATOR MOTION MGMT SIMUL: CPT | Performed by: RADIOLOGY

## 2021-03-10 PROCEDURE — 77334 RADIATION TREATMENT AID(S): CPT | Performed by: RADIOLOGY

## 2021-03-10 PROCEDURE — 77300 RADIATION THERAPY DOSE PLAN: CPT | Performed by: RADIOLOGY

## 2021-03-11 PROCEDURE — 77280 THER RAD SIMULAJ FIELD SMPL: CPT | Performed by: RADIOLOGY

## 2021-03-11 PROCEDURE — 77427 RADIATION TX MANAGEMENT X5: CPT | Performed by: RADIOLOGY

## 2021-03-11 PROCEDURE — 77412 RADIATION TX DELIVERY LVL 3: CPT | Performed by: RADIOLOGY

## 2021-03-12 ENCOUNTER — TELEPHONE (OUTPATIENT)
Dept: OTHER | Facility: HOSPITAL | Age: 56
End: 2021-03-12

## 2021-03-12 PROCEDURE — 77387 GUIDANCE FOR RADJ TX DLVR: CPT | Performed by: RADIOLOGY

## 2021-03-12 PROCEDURE — 77412 RADIATION TX DELIVERY LVL 3: CPT | Performed by: RADIOLOGY

## 2021-03-12 NOTE — TELEPHONE ENCOUNTER
"Oncology Social Work  [facility] - [facility]    Referral received from radiation center to see for psychosocial support and community resources.  Patient scored a 0 on distress tool, but scored an 8 on the PHQ9.    Patient diagnosed with left breast DCIS high grade ER/IA negative breast cancer -s/p lumpectomy.  No chemotherapy planned. Radiation planned - 16 tx's - preventive goal.   Explained role of OSW and services we can assist with.  Patient has hx of depression and is on Lexapro.  Patient lives at home with her  and 17 yr old son. She also has a daughter - 20 yr old who is a Sophomore at [employer].    Patient reports no financial distress , no transportation barriers.  She is active and independent with ADL\"s.  Still works. Has health insurance. No home care needs. She is an RN.  Her  is still employed.    Patient's biggest distress is the relationship with her  and the strained and renu relationship between her  and their daughter.  OSW recommended couples and family therapy .  Provided names of three Licensed Marriage and Family Therapists.  Patient plans to reach out and find a provider who accepts their [employer].   Her daughter is currently in individual therapy with her own therapist on campus.     Patient has my contact information if additional needs arise    Irish Farias, JHONW, CSW  "

## 2021-03-15 PROCEDURE — 77412 RADIATION TX DELIVERY LVL 3: CPT | Performed by: RADIOLOGY

## 2021-03-15 PROCEDURE — 77387 GUIDANCE FOR RADJ TX DLVR: CPT | Performed by: RADIOLOGY

## 2021-03-16 PROCEDURE — 77412 RADIATION TX DELIVERY LVL 3: CPT | Performed by: RADIOLOGY

## 2021-03-16 PROCEDURE — 77336 RADIATION PHYSICS CONSULT: CPT | Performed by: RADIOLOGY

## 2021-03-16 PROCEDURE — 77387 GUIDANCE FOR RADJ TX DLVR: CPT | Performed by: RADIOLOGY

## 2021-03-17 ENCOUNTER — RADIATION ONCOLOGY WEEKLY ASSESSMENT (OUTPATIENT)
Dept: RADIATION ONCOLOGY | Facility: HOSPITAL | Age: 56
End: 2021-03-17

## 2021-03-17 DIAGNOSIS — D05.12 DUCTAL CARCINOMA IN SITU (DCIS) OF LEFT BREAST: Primary | ICD-10-CM

## 2021-03-17 PROCEDURE — 77412 RADIATION TX DELIVERY LVL 3: CPT | Performed by: RADIOLOGY

## 2021-03-17 PROCEDURE — 77387 GUIDANCE FOR RADJ TX DLVR: CPT | Performed by: RADIOLOGY

## 2021-03-17 NOTE — PROGRESS NOTES
Physician Weekly Management Note    Diagnosis:     Diagnosis Plan   1. Ductal carcinoma in situ (DCIS) of left breast         RT Details:  fx 5/16 left breast    Notes on Treatment course, Films, Medical progress:  Doing well,     Weekly Management:  Medication reviewed?   Yes  New medications given?   No  Problemlist reviewed?   Yes  Problem added?   No  Issues raised requiring referral to support services - task assigned to:  clifford    Technical aspects reviewed:  Weekly OBI approved?   Yes  Weekly port films approved?   Yes  Change requests noted on port film?   No  Patient setup and plan reviewed?   Yes    Chart Reviewed:  Continue current treatment plan?   Yes  Treatment plan change requested?   No  CBC reviewed?   No  Concurrent Chemo?   No    Objective     Toxicities:   none     Review of Systems   Constitutional: Positive for fatigue.   Skin: Negative for color change.          There were no vitals filed for this visit.    Current Status 3/1/2021   ECOG score 0       Physical Exam  Constitutional:       Appearance: Normal appearance.   Chest:          Comments: No erythema  Neurological:      Mental Status: She is alert.             Problem Summary List    Diagnosis:     Diagnosis Plan   1. Ductal carcinoma in situ (DCIS) of left breast       Pathology:   breast    Past Medical History:   Diagnosis Date   • Acid reflux disease    • Allergic rhinitis    • Anxiety and depression    • Arthritis    • Bladder spasm    • Breast cancer, left (CMS/HCC)    • History of 2019 novel coronavirus disease (COVID-19)     12/2020 ASYMPTOMATIC    • Low back pain    • Migraines    • Osteopenia    • Palpitation     ECHO DONE 2019 - NO FOLLOW UP REQUIRED   • Polyp of sigmoid colon    • PONV (postoperative nausea and vomiting)    • Post-menopausal    • Vitamin D deficiency          Past Surgical History:   Procedure Laterality Date   • BREAST BIOPSY     • BREAST LUMPECTOMY Left 2/9/2021    Procedure: Left needle-localized partial  mastectomy;  Surgeon: Fernanda Barbour MD;  Location: [facility] MAIN OR;  Service: General;  Laterality: Left;   • COLONOSCOPY  05/2015    Dr. Ramires   • CYSTOSCOPY      diagnostic, Dr. Dominique [facility]   • DIAGNOSTIC LAPAROSCOPY  1991   • HYSTERECTOMY  2006    bladder track, recrocele/cystocele/endometriosis   • KNEE ARTHROSCOPY Left          Current Outpatient Medications on File Prior to Visit   Medication Sig Dispense Refill   • acyclovir (ZOVIRAX) 400 MG tablet Take 400 mg by mouth As Needed. Take no more than 5 doses a day.      • aspirin 81 MG tablet Take 81 mg by mouth Daily. HELD FOR OR     • buPROPion SR (WELLBUTRIN SR) 200 MG 12 hr tablet Take 150 mg by mouth Daily.     • Cholecalciferol (VITAMIN D3) 2000 units tablet Take 2,000 Units by mouth Daily. HELD FOR OR     • cyclobenzaprine (FLEXERIL) 5 MG tablet Take 5 mg by mouth 3 (Three) Times a Day As Needed for Muscle Spasms.     • docusate sodium (COLACE) 250 MG capsule Take 250 mg by mouth Daily.     • escitalopram (LEXAPRO) 20 MG tablet Take 20 mg by mouth Daily.     • famotidine (PEPCID) 40 MG tablet Take 40 mg by mouth.     • meloxicam (MOBIC) 15 MG tablet Take 15 mg by mouth Daily. HELD FOR OR     • omeprazole (priLOSEC) 20 MG capsule Take 20 mg by mouth Daily.     • PRENATAL 28-0.8 MG tablet Take 1 tablet by mouth Daily. HELD FOR OR     • SUMAtriptan (IMITREX) 50 MG tablet Take 50 mg by mouth Every 2 (Two) Hours As Needed for Migraine. Take one tablet at onset of headache. May repeat dose one time in 2 hours if headache not relieved.     • TURMERIC CURCUMIN PO Take  by mouth Daily. HELD FOR OR     • uribel (URO-MP) 118 MG capsule capsule Take 1 capsule by mouth As Needed.     • vitamin D (ERGOCALCIFEROL) 1.25 MG (19134 UT) capsule capsule Take 50,000 Units by mouth Every 30 (Thirty) Days.       No current facility-administered medications on file prior to visit.       Allergies   Allergen Reactions   • Vilazodone Hcl Hallucinations     hallucinations      • Morphine Itching   • Penicillins Rash   • Sulfa Antibiotics Rash   • Thimerosal Itching         Referring Provider:    No referring provider defined for this encounter.    Oncologist:  No primary care provider on file.      Seen and approved by:  Yary Davis MD  03/17/2021

## 2021-03-18 PROCEDURE — 77412 RADIATION TX DELIVERY LVL 3: CPT | Performed by: RADIOLOGY

## 2021-03-18 PROCEDURE — 77427 RADIATION TX MANAGEMENT X5: CPT | Performed by: RADIOLOGY

## 2021-03-18 PROCEDURE — 77387 GUIDANCE FOR RADJ TX DLVR: CPT | Performed by: RADIOLOGY

## 2021-03-19 PROCEDURE — 77412 RADIATION TX DELIVERY LVL 3: CPT | Performed by: RADIOLOGY

## 2021-03-19 PROCEDURE — 77387 GUIDANCE FOR RADJ TX DLVR: CPT | Performed by: RADIOLOGY

## 2021-03-22 PROCEDURE — 77412 RADIATION TX DELIVERY LVL 3: CPT | Performed by: RADIOLOGY

## 2021-03-22 PROCEDURE — 77387 GUIDANCE FOR RADJ TX DLVR: CPT | Performed by: RADIOLOGY

## 2021-03-23 PROCEDURE — 77412 RADIATION TX DELIVERY LVL 3: CPT | Performed by: RADIOLOGY

## 2021-03-23 PROCEDURE — 77387 GUIDANCE FOR RADJ TX DLVR: CPT | Performed by: RADIOLOGY

## 2021-03-23 PROCEDURE — 77336 RADIATION PHYSICS CONSULT: CPT | Performed by: RADIOLOGY

## 2021-03-24 ENCOUNTER — RADIATION ONCOLOGY WEEKLY ASSESSMENT (OUTPATIENT)
Dept: RADIATION ONCOLOGY | Facility: HOSPITAL | Age: 56
End: 2021-03-24

## 2021-03-24 DIAGNOSIS — D05.12 DUCTAL CARCINOMA IN SITU (DCIS) OF LEFT BREAST: Primary | ICD-10-CM

## 2021-03-24 PROCEDURE — 77412 RADIATION TX DELIVERY LVL 3: CPT | Performed by: RADIOLOGY

## 2021-03-24 PROCEDURE — 77387 GUIDANCE FOR RADJ TX DLVR: CPT | Performed by: RADIOLOGY

## 2021-03-24 NOTE — PROGRESS NOTES
Physician Weekly Management Note    Diagnosis:     Diagnosis Plan   1. Ductal carcinoma in situ (DCIS) of left breast         RT Details:  fx 10/16 left breast    Notes on Treatment course, Films, Medical progress:  Doing ok, increased fatigue, worse in afternoons, no erythema    Weekly Management:  Medication reviewed?   Yes  New medications given?   No  Problemlist reviewed?   Yes  Problem added?   No  Issues raised requiring referral to support services - task assigned to:  na    Technical aspects reviewed:  Weekly OBI approved?   Yes  Weekly port films approved?   Yes  Change requests noted on port film?   No  Patient setup and plan reviewed?   Yes    Chart Reviewed:  Continue current treatment plan?   Yes  Treatment plan change requested?   No  CBC reviewed?   No  Concurrent Chemo?   No    Objective     Toxicities:   fatigue     Review of Systems   Constitutional: Positive for fatigue.   Skin: Negative.           There were no vitals filed for this visit.    Current Status 3/1/2021   ECOG score 0       Physical Exam  Constitutional:       Appearance: Normal appearance.   Chest:          Comments: No eryhtema  Neurological:      Mental Status: She is alert.             Problem Summary List    Diagnosis:     Diagnosis Plan   1. Ductal carcinoma in situ (DCIS) of left breast       Pathology:   breast    Past Medical History:   Diagnosis Date   • Acid reflux disease    • Allergic rhinitis    • Anxiety and depression    • Arthritis    • Bladder spasm    • Breast cancer, left (CMS/HCC)    • History of 2019 novel coronavirus disease (COVID-19)     12/2020 ASYMPTOMATIC    • Low back pain    • Migraines    • Osteopenia    • Palpitation     ECHO DONE 2019 - NO FOLLOW UP REQUIRED   • Polyp of sigmoid colon    • PONV (postoperative nausea and vomiting)    • Post-menopausal    • Vitamin D deficiency          Past Surgical History:   Procedure Laterality Date   • BREAST BIOPSY     • BREAST LUMPECTOMY Left 2/9/2021     Procedure: Left needle-localized partial mastectomy;  Surgeon: Fernanda Barbour MD;  Location: [facility] OR;  Service: General;  Laterality: Left;   • COLONOSCOPY  05/2015    Dr. Ramires   • CYSTOSCOPY      diagnostic, Dr. Catina Phillips   • DIAGNOSTIC LAPAROSCOPY  1991   • HYSTERECTOMY  2006    bladder track, recrocele/cystocele/endometriosis   • KNEE ARTHROSCOPY Left          Current Outpatient Medications on File Prior to Visit   Medication Sig Dispense Refill   • acyclovir (ZOVIRAX) 400 MG tablet Take 400 mg by mouth As Needed. Take no more than 5 doses a day.      • aspirin 81 MG tablet Take 81 mg by mouth Daily. HELD FOR OR     • buPROPion SR (WELLBUTRIN SR) 200 MG 12 hr tablet Take 150 mg by mouth Daily.     • Cholecalciferol (VITAMIN D3) 2000 units tablet Take 2,000 Units by mouth Daily. HELD FOR OR     • cyclobenzaprine (FLEXERIL) 5 MG tablet Take 5 mg by mouth 3 (Three) Times a Day As Needed for Muscle Spasms.     • docusate sodium (COLACE) 250 MG capsule Take 250 mg by mouth Daily.     • escitalopram (LEXAPRO) 20 MG tablet Take 20 mg by mouth Daily.     • famotidine (PEPCID) 40 MG tablet Take 40 mg by mouth.     • meloxicam (MOBIC) 15 MG tablet Take 15 mg by mouth Daily. HELD FOR OR     • omeprazole (priLOSEC) 20 MG capsule Take 20 mg by mouth Daily.     • PRENATAL 28-0.8 MG tablet Take 1 tablet by mouth Daily. HELD FOR OR     • SUMAtriptan (IMITREX) 50 MG tablet Take 50 mg by mouth Every 2 (Two) Hours As Needed for Migraine. Take one tablet at onset of headache. May repeat dose one time in 2 hours if headache not relieved.     • TURMERIC CURCUMIN PO Take  by mouth Daily. HELD FOR OR     • uribel (URO-MP) 118 MG capsule capsule Take 1 capsule by mouth As Needed.     • vitamin D (ERGOCALCIFEROL) 1.25 MG (60857 UT) capsule capsule Take 50,000 Units by mouth Every 30 (Thirty) Days.       No current facility-administered medications on file prior to visit.       Allergies   Allergen Reactions   •  Vilazodone Hcl Hallucinations     hallucinations   • Morphine Itching   • Penicillins Rash   • Sulfa Antibiotics Rash   • Thimerosal Itching         Referring Provider:    No referring provider defined for this encounter.    Oncologist:  No primary care provider on file.      Seen and approved by:  Yary Davis MD  03/24/2021

## 2021-03-25 PROCEDURE — 77412 RADIATION TX DELIVERY LVL 3: CPT | Performed by: RADIOLOGY

## 2021-03-25 PROCEDURE — 77387 GUIDANCE FOR RADJ TX DLVR: CPT | Performed by: RADIOLOGY

## 2021-03-25 PROCEDURE — 77427 RADIATION TX MANAGEMENT X5: CPT | Performed by: RADIOLOGY

## 2021-03-26 ENCOUNTER — BULK ORDERING (OUTPATIENT)
Dept: CASE MANAGEMENT | Facility: OTHER | Age: 56
End: 2021-03-26

## 2021-03-26 DIAGNOSIS — Z23 IMMUNIZATION DUE: ICD-10-CM

## 2021-03-26 PROCEDURE — 77412 RADIATION TX DELIVERY LVL 3: CPT | Performed by: RADIOLOGY

## 2021-03-26 PROCEDURE — 77387 GUIDANCE FOR RADJ TX DLVR: CPT | Performed by: RADIOLOGY

## 2021-03-29 ENCOUNTER — IMMUNIZATION (OUTPATIENT)
Dept: VACCINE CLINIC | Facility: HOSPITAL | Age: 56
End: 2021-03-29

## 2021-03-29 DIAGNOSIS — Z23 IMMUNIZATION DUE: ICD-10-CM

## 2021-03-29 PROCEDURE — 77412 RADIATION TX DELIVERY LVL 3: CPT | Performed by: RADIOLOGY

## 2021-03-29 PROCEDURE — 77387 GUIDANCE FOR RADJ TX DLVR: CPT | Performed by: RADIOLOGY

## 2021-03-29 PROCEDURE — 0001A: CPT | Performed by: INTERNAL MEDICINE

## 2021-03-29 PROCEDURE — 91300 HC SARSCOV02 VAC 30MCG/0.3ML IM: CPT | Performed by: INTERNAL MEDICINE

## 2021-03-30 PROCEDURE — 77412 RADIATION TX DELIVERY LVL 3: CPT | Performed by: RADIOLOGY

## 2021-03-30 PROCEDURE — 77336 RADIATION PHYSICS CONSULT: CPT | Performed by: RADIOLOGY

## 2021-03-30 PROCEDURE — 77387 GUIDANCE FOR RADJ TX DLVR: CPT | Performed by: RADIOLOGY

## 2021-03-31 ENCOUNTER — RADIATION ONCOLOGY WEEKLY ASSESSMENT (OUTPATIENT)
Dept: RADIATION ONCOLOGY | Facility: HOSPITAL | Age: 56
End: 2021-03-31

## 2021-03-31 DIAGNOSIS — D05.12 DUCTAL CARCINOMA IN SITU (DCIS) OF LEFT BREAST: Primary | ICD-10-CM

## 2021-03-31 PROCEDURE — 77412 RADIATION TX DELIVERY LVL 3: CPT | Performed by: RADIOLOGY

## 2021-03-31 PROCEDURE — 77387 GUIDANCE FOR RADJ TX DLVR: CPT | Performed by: RADIOLOGY

## 2021-03-31 NOTE — PROGRESS NOTES
Physician Weekly Management Note    Diagnosis:     Diagnosis Plan   1. Ductal carcinoma in situ (DCIS) of left breast         RT Details:  Treatment #15/16    Notes on Treatment course, Films, Medical progress:  Reports only fatigue, denies any skin issues or breast tenderness.  Continue as planned.      [facility] Onc ECOG Status: (0) Fully active, able to carry on all predisease performance without restriction      Weekly Management:  Medication reviewed?   Yes  New medications given?   No  Problemlist reviewed?   Yes  Problem added?   No      Technical aspects reviewed:  Weekly OBI approved?   Yes  Weekly port films approved?   Yes  Change requests noted on port film?   No  Patient setup and plan reviewed?   Yes    Chart Reviewed:  Continue current treatment plan?   Yes  Treatment plan change requested?   No  CBC reviewed?   No  Concurrent Chemo?   No    Objective     Toxicities:   As above     Review of Systems   As above    There were no vitals filed for this visit.    Current Status 3/1/2021   ECOG score 0       Physical Exam  As above      Problem Summary List    Diagnosis:     Diagnosis Plan   1. Ductal carcinoma in situ (DCIS) of left breast       Pathology:       Past Medical History:   Diagnosis Date   • Acid reflux disease    • Allergic rhinitis    • Anxiety and depression    • Arthritis    • Bladder spasm    • Breast cancer, left (CMS/HCC)    • History of 2019 novel coronavirus disease (COVID-19)     12/2020 ASYMPTOMATIC    • Low back pain    • Migraines    • Osteopenia    • Palpitation     ECHO DONE 2019 - NO FOLLOW UP REQUIRED   • Polyp of sigmoid colon    • PONV (postoperative nausea and vomiting)    • Post-menopausal    • Vitamin D deficiency          Past Surgical History:   Procedure Laterality Date   • BREAST BIOPSY     • BREAST LUMPECTOMY Left 2/9/2021    Procedure: Left needle-localized partial mastectomy;  Surgeon: Fernanda Barbour MD;  Location: [facility];  Service: General;   Laterality: Left;   • COLONOSCOPY  05/2015    Dr. Ramires   • CYSTOSCOPY      diagnostic, Dr. Dominique [facility]   • DIAGNOSTIC LAPAROSCOPY  1991   • HYSTERECTOMY  2006    bladder track, recrocele/cystocele/endometriosis   • KNEE ARTHROSCOPY Left          Current Outpatient Medications on File Prior to Visit   Medication Sig Dispense Refill   • acyclovir (ZOVIRAX) 400 MG tablet Take 400 mg by mouth As Needed. Take no more than 5 doses a day.      • aspirin 81 MG tablet Take 81 mg by mouth Daily. HELD FOR OR     • buPROPion SR (WELLBUTRIN SR) 200 MG 12 hr tablet Take 150 mg by mouth Daily.     • Cholecalciferol (VITAMIN D3) 2000 units tablet Take 2,000 Units by mouth Daily. HELD FOR OR     • cyclobenzaprine (FLEXERIL) 5 MG tablet Take 5 mg by mouth 3 (Three) Times a Day As Needed for Muscle Spasms.     • docusate sodium (COLACE) 250 MG capsule Take 250 mg by mouth Daily.     • escitalopram (LEXAPRO) 20 MG tablet Take 20 mg by mouth Daily.     • famotidine (PEPCID) 40 MG tablet Take 40 mg by mouth.     • meloxicam (MOBIC) 15 MG tablet Take 15 mg by mouth Daily. HELD FOR OR     • omeprazole (priLOSEC) 20 MG capsule Take 20 mg by mouth Daily.     • PRENATAL 28-0.8 MG tablet Take 1 tablet by mouth Daily. HELD FOR OR     • SUMAtriptan (IMITREX) 50 MG tablet Take 50 mg by mouth Every 2 (Two) Hours As Needed for Migraine. Take one tablet at onset of headache. May repeat dose one time in 2 hours if headache not relieved.     • TURMERIC CURCUMIN PO Take  by mouth Daily. HELD FOR OR     • uribel (URO-MP) 118 MG capsule capsule Take 1 capsule by mouth As Needed.     • vitamin D (ERGOCALCIFEROL) 1.25 MG (19967 UT) capsule capsule Take 50,000 Units by mouth Every 30 (Thirty) Days.       No current facility-administered medications on file prior to visit.       Allergies   Allergen Reactions   • Vilazodone Hcl Hallucinations     hallucinations   • Morphine Itching   • Penicillins Rash   • Sulfa Antibiotics Rash   • Thimerosal Itching         Primary care MD:    Tala Yip DO    Oncologist:  Patient Care Team:  Jarvis George MD as Consulting Physician (Cardiology)  Fernanda Barbour MD as Referring Physician (Breast Surgery)  Yary Davis MD as Consulting Physician (Radiation Oncology)  Juan Olvera MD as Consulting Physician (Hematology and Oncology)       Seen and approved by:  Khai Ngo MD  03/31/2021

## 2021-04-01 ENCOUNTER — APPOINTMENT (OUTPATIENT)
Dept: RADIATION ONCOLOGY | Facility: HOSPITAL | Age: 56
End: 2021-04-01

## 2021-04-01 ENCOUNTER — DOCUMENTATION (OUTPATIENT)
Dept: RADIATION ONCOLOGY | Facility: HOSPITAL | Age: 56
End: 2021-04-01

## 2021-04-01 DIAGNOSIS — D05.12 DUCTAL CARCINOMA IN SITU (DCIS) OF LEFT BREAST: Primary | ICD-10-CM

## 2021-04-01 PROCEDURE — 77412 RADIATION TX DELIVERY LVL 3: CPT | Performed by: RADIOLOGY

## 2021-04-01 PROCEDURE — 77387 GUIDANCE FOR RADJ TX DLVR: CPT | Performed by: RADIOLOGY

## 2021-04-19 ENCOUNTER — IMMUNIZATION (OUTPATIENT)
Dept: VACCINE CLINIC | Facility: HOSPITAL | Age: 56
End: 2021-04-19

## 2021-04-19 PROCEDURE — 91300 HC SARSCOV02 VAC 30MCG/0.3ML IM: CPT | Performed by: INTERNAL MEDICINE

## 2021-04-19 PROCEDURE — 0002A: CPT | Performed by: INTERNAL MEDICINE

## 2021-04-22 ENCOUNTER — OFFICE VISIT (OUTPATIENT)
Dept: OTHER | Facility: HOSPITAL | Age: 56
End: 2021-04-22

## 2021-04-22 VITALS
TEMPERATURE: 97.8 F | WEIGHT: 231.2 LBS | DIASTOLIC BLOOD PRESSURE: 86 MMHG | RESPIRATION RATE: 18 BRPM | BODY MASS INDEX: 35.04 KG/M2 | OXYGEN SATURATION: 98 % | SYSTOLIC BLOOD PRESSURE: 140 MMHG | HEART RATE: 77 BPM

## 2021-04-22 DIAGNOSIS — D05.12 DUCTAL CARCINOMA IN SITU (DCIS) OF LEFT BREAST: Primary | ICD-10-CM

## 2021-04-22 PROCEDURE — 99215 OFFICE O/P EST HI 40 MIN: CPT | Performed by: NURSE PRACTITIONER

## 2021-04-22 PROCEDURE — G0463 HOSPITAL OUTPT CLINIC VISIT: HCPCS | Performed by: NURSE PRACTITIONER

## 2021-04-22 NOTE — PROGRESS NOTES
[facility] MULTIDISCIPLINARY CLINIC  SURVIVORSHIP VISIT    Vivienne Barbour is a pleasant 56 y.o. female being followed by Nelsy Olvera MD  for left breast DCIS. Here today in our Multidisciplinary Clinic, to review treatment summary and survivorship care plan.    HPI  Patient reports doing fairly well since completion of radiation about 3 weeks ago. She reports there are some areas of peeling skin around the left nipple and areola but otherwise she continues to moisturize the area daily. She does report having trouble falling and staying asleep. She has been experiencing hot flashes with night sweats. Patient was transitioned off of Lexapro and onto Effexor and this has been helpful in  reducing the symptoms. She had been taking Lexapro for several years for depression. Since the medication change she feels that her depression symptoms are stable. She does report significant fatigue with cognitive changes.       TREATMENT HISTORY:     Oncology/Hematology History Overview Note   11/07/18, Screening MMG with [facility] ([facility]):  Scattered areas of fibroglandular density. There is a stable oval mass measuring 15 millimeters seen in the posterior one-third region of the right breast at 10 o'clock. No suspicious masses, suspicious microcalcifications or new areas of architectural distortion are identified. There has been no significant change from the prior exam(s). In the left breast, no suspicious masses, significant calcifications or other abnormalities are seen.  BI-RADS 2: Benign.    11/15/19, Screening MMG with [facility] ([facility]):  Scattered areas of fibroglandular density. There is a focal asymmetry seen in the posterior one-third upper outer region of the left breast. In the right breast, no suspicious masses, significant calcifications or other abnormalities are seen.  BI-RADS 0: Incomplete.    12/17/19, Left Diagnostic MMG with [facility] & Left Breast US ([facility]):  MMG:  On the present examination,  focal asymmetry in the left breast, upper outer does not persist. With all diagnostic views, this area is not seen. No suspicious mammographic finding is seen.  US:  No suspicious sonographic finding is seen. A small incidental simple cyst with a thin smooth internal septations is present at the 1:30 location, 2 cm from the nipple. This is not the mammographic area which was evaluated and is a small incidental benign simple cyst.  BI-RADS 1: Negative.     Ductal carcinoma in situ (DCIS) of left breast   12/10/2020 Initial Diagnosis    Ductal carcinoma in situ (DCIS) of left breast     12/11/2020 Imaging    Screening MMG with [facility] ([facility]):  Scattered areas of fibroglandular density.  1. There are a few new punctate calcifications with grouped distribution seen in the middle one-third 1:30 o'clock region of the left breast.  2. There is a stable oval mass measuring 15 mm seen in the posterior one-third 10:30 o'clock region of the right breast. No suspicious masses, suspicious microcalcifications or new areas of architectural distortion are identified. There has been no significant change from the prior exam(s).  BI-RADS 0: Incomplete.     1/4/2021 Imaging    Left Diagnostic MMG with [facility] ([facility]):  On the present examination, there are new amorphous and indistinct calcifications measuring 4 mm with grouped distribution in the middle one-third 1:30 o'clock region of the left breast located 10 centimeters from the nipple. Additional imaging demonstrates microcalcifications are suspicious and tissue sampling is recommended.  Bi-RADS 4B: Suspicious.     1/11/2021 Biopsy    Left Breast, Stereotactic Biopsy ([facility]):    Breast, Left 1:30 O'clock, Core Needle Biopsy:  Ductal Carcinoma in Situ (DCIS), High Nuclear Grade, Comedo Pattern, 3 mm in Greatest Dimension, Present in 3 of 13 Cores.   Microcalcifications Associated with DCIS and Benign Mammary Ducts/Lobules.    ER negative (0.29%)  AL negative (0%)  Ki-67  32.68%    -Tophat clip. No residual calcifications were visible on post procedure mammogram.     1/15/2021 Imaging    Bilateral Breast MRI ([facility]):  RIGHT BREAST:    At 10:00 in the posterior right breast, approximately 9 cm posterior to the nipple, there is a 1.4 cm AP dimension, 0.9 cm transverse dimension, 0.9 cm craniocaudal dimension oval enhancing mass, which is mammographically stable dating back to at least 2016. No suspicious enhancing mass or area of non-mass enhancement is identified. The visualized axilla is within normal limits.    LEFT BREAST:    At 1:00, approximately 6 cm posterior to the nipple, there is an approximately 1.2 x 0.7 x 3.1 cm biopsy cavity/tract with a central focus of susceptibility from a biopsy clip, which marks the site of  biopsy-proven malignancy. There is subtle enhancement extending from the biopsy site to the overlying skin, which is likely postbiopsy in nature. No suspicious mass or mass enhancement is identified at this location or elsewhere within the left breast. No suspicious enhancement is identified in the left nipple or chest wall. The visualized axilla is within normal limits.   EXTRAMAMMARY FINDINGS:   There are no abnormally enlarged internal mammary chain lymph nodes on either side.  BI-RADS 6: Known malignancy.     1/19/2021 Genetic Testing    [employer] Common Hereditary Cancers Panel (47 genes):    VUS in [id number]     2/9/2021 Surgery    Left needle-localized partial mastectomy    1. Left Breast, Oriented Needle Localization Partial Mastectomy (29 grams):               A. Benign breast tissue with sclerosing adenosis, fibrocystic change and usual ductal hyperplasia with associated       calcifications (see Comment).  B. Biopsy site identified and clip retrieved.  C. No atypical hyperplasia, in situ nor invasive carcinoma identified (margins clear).     2. Left Breast, Additional Superior, Medial and Posterior Margins, Oriented Excision:               A. Benign fatty  breast tissue with focal columnar cell hyperplasia.                B. No atypical hyperplasia, in situ nor invasive carcinoma identified (margins clear).     3/1/2021 Cancer Staged    Staging form: Breast, AJCC 8th Edition  - Pathologic: Stage Unknown (pTis (DCIS), pNX, G2, ER-, [place]-) - Signed by Juan Olvera MD on 3/1/2021     3/11/2021 - 4/1/2021 Radiation    Radiation OncologyTreatment Course:  Vivienne Barbour received 4256 cGy in 16 fractions to LEFT breast.      Hormonal Therapy    Evista planned after review of bone density         Allergies as of 04/22/2021 - Reviewed 04/22/2021   Allergen Reaction Noted   • Vilazodone hcl Hallucinations 03/11/2019   • Morphine Itching 05/08/2015   • Penicillins Rash 05/08/2015   • Sulfa antibiotics Rash 05/08/2015   • Thimerosal Itching 05/08/2015       MEDICATIONS:  Medication list reviewed today.    Review of Systems   Constitutional: Positive for activity change and fatigue. Negative for appetite change, chills, fever and unexpected weight change.        Positive for hot flashes with night sweats   Respiratory: Positive for shortness of breath (With stairs, resolves with rest). Negative for chest tightness.    Cardiovascular: Positive for leg swelling (Bilateral ankles). Negative for chest pain.   Gastrointestinal: Negative for blood in stool, constipation, diarrhea and nausea.   Genitourinary: Negative for hematuria.   Musculoskeletal: Positive for arthralgias (Osteoarthritis of bilateral knees). Negative for myalgias.   Skin: Positive for color change (At radiation treatment site, left breast).   Neurological: Negative for dizziness, light-headedness and numbness.   Psychiatric/Behavioral: Positive for decreased concentration, dysphoric mood and sleep disturbance. The patient is not nervous/anxious.        /86   Pulse 77   Temp 97.8 °F (36.6 °C) (Temporal)   Resp 18   Wt 105 kg (231 lb 3.2 oz)   SpO2 98% Comment: Room air  BMI 35.04 kg/m²     Wt  Readings from Last 3 Encounters:   21 105 kg (231 lb 3.2 oz)   21 106 kg (234 lb)   21 106 kg (234 lb 3.2 oz)       Pain Score    21 1300   PainSc: 0-No pain       · Distress score: 7  · PHQ9: 11  · GAD7: 7      Physical Exam  Constitutional:       Appearance: Normal appearance.   HENT:      Head: Normocephalic and atraumatic.   Cardiovascular:      Rate and Rhythm: Normal rate and regular rhythm.   Pulmonary:      Effort: Pulmonary effort is normal.   Abdominal:      General: Abdomen is flat.   Musculoskeletal:      Right shoulder: Normal range of motion.      Left shoulder: Normal range of motion.      Right lower le+ Edema present.      Left lower le+ Edema present.   Skin:     General: Skin is warm and dry.      Capillary Refill: Capillary refill takes less than 2 seconds.   Neurological:      Mental Status: She is alert and oriented to person, place, and time.   Psychiatric:         Attention and Perception: Attention and perception normal.         Mood and Affect: Mood and affect normal.         Speech: Speech normal.         Behavior: Behavior normal. Behavior is cooperative.         Thought Content: Thought content normal.         Cognition and Memory: Cognition and memory normal.         Judgment: Judgment normal.           Problems Addressed this Visit        Active Problems    Ductal carcinoma in situ (DCIS) of left breast - Primary      Diagnoses       Codes Comments    Ductal carcinoma in situ (DCIS) of left breast    -  Primary ICD-10-CM: D05.12  ICD-9-CM: 233.0             SURVIVORSHIP DISCUSSION HELD TODAY:     Problems identified:  1. Hot flashes: Hot flashes with night sweats regularly however improved with Effexor. We reviewed nonpharmacologic strategies such as reductions in caffeine, alcohol, refined sugars, managing stress.  2. Lymphedema: Discussed her risk of lymphedema would be extraordinarily low as she had no sentinel node procedure performed at the time of  surgery. We discussed that there may be some theoretical risk of lymphedema associated with radiation and patient instructed on early signs and symptoms of lymphedema and provided with written information from [employer]. Currently she has full range of motion of her upper extremities and denies pain, tightness, or swelling.  3. Fatigue: This has been significant for her beginning about a week after completion of radiation. She reports spending a lot of time resting and she had taken some time off work. Now she has returned to work full-time. She has been having some cognitive changes with decreased concentration. We discussed the important role exercise will play in management of this as well as improving quality of sleep, mood, and achieving and maintaining a healthy weight. She does have some osteoarthritis in her knees however she tolerates walking and has access to strength training equipment in her home. Encouraged her especially towards weightbearing exercise in the context of osteopenia identified on DEXA scan January 2020.  4. Achieve and maintain a healthy weight: We discussed achieving and maintaining a healthy weight as a cancer risk reduction strategy. She acknowledges there has been some weight gain this year and is interested in some weight reduction. She is aware this will also assist her in improved blood pressure management as she has been running around 140 systolic beginning this year. She has had discussions with her primary care physician. We did discuss availability of oncology certified dietitian and she was provided that contact information.  5. Depression: PHQ-9 score of 11 however patient reports her symptoms are stable with the switch from Lexapro to Effexor. She has some worries about her daughter who seemed to be more worried about the patient's cancer than expected. We discussed resources available at [employer] for the patient and her entire family including support  groups, physical activity offerings appropriate for all levels of experience and ability and educational offerings.  6. Genetics: Patient did have genetic testing completed prior to surgery however extended testing to screen for colorectal associated cancer syndromes has not yet been completed. Dr. Olvera has referred her for genetic counseling and the patient understands she will be scheduled for that is soon as possible once the clinic resumes the summer. She does have a significant family history of colorectal cancer. Currently she is seen for colonoscopies every 3 years per her physicians recommendation.      Plan and recommendations:  1. [employer] and upcoming sharing her story support group for peer based support.  2. We also discussed [employer] as a resource for low impact physical activity offerings.  3. Oncology nutrition as desired. Patient declines referral today but will call when ready.  4. Cancer [facility] Care[facility] for further information regarding managing side effects in the workplace, discussing health status with employers and financial support services  5. Call the office as needed for additional information, resources or support.      Advance Care Planning   Advance Care Planning Discussion:    Patient does not have advance care planning complete.     Brief discussion and written information provided regarding advance care planning and appropriateness for all healthy adults, choosing a healthcare surrogate, prior experiences with loved ones who have been seriously ill, exploration of goals of care in the event of a sudden injury or illness, and upcoming Advance Care Planning classes offered monthly at the [facility], or to schedule an appointment with myself or another advance care planning facilitator and patient provided with copy of conversations that matter booklet.             Discussed NCCN recommendations for all cancer survivors of 150 minutes/week moderate intensity exercise,  achieve and maintain a healthy BMI, plants-based whole-foods diet, avoid tobacco and second hand smoke, avoid alcohol or minimize alcohol intake - no more than 1 drink in a day for adults.   No orders of the defined types were placed in this encounter.      After review of the Survivorship Treatment Summary & Care Plan, the patient verbalized understanding of recommendations for follow-up. As outlined in the care plan, they were advised to continue with follow-up care in accordance with the NCCN surveillance guidelines while transitioning back to their primary care physician for continued general preventive and healthcare needs. We discussed the importance of healthy eating, exercise and weight management. We reviewed current guidelines for routine screening of other cancers.     A copy of the Survivorship Treatment Summary & Care Plan for Ms. Barbour was provided to and forwarded to the providers identified on the care team.      Greater than 40 minutes spent with patient, more than half of that spent face-to-face counseling patient extensively on treatment summary, surveillance for recurrence, late and long-term effects of disease and treatment, intimacy and self-image, preventative screening for other cancers, achieving/maintaining healthy BMI and link to risk reduction, adherence to current therapies, management of anxiety/uncertainty and self care strategies.

## 2021-04-29 ENCOUNTER — OFFICE VISIT (OUTPATIENT)
Dept: RADIATION ONCOLOGY | Facility: HOSPITAL | Age: 56
End: 2021-04-29

## 2021-04-29 VITALS
OXYGEN SATURATION: 99 % | HEART RATE: 93 BPM | TEMPERATURE: 97.3 F | SYSTOLIC BLOOD PRESSURE: 131 MMHG | DIASTOLIC BLOOD PRESSURE: 87 MMHG

## 2021-04-29 DIAGNOSIS — D05.12 DUCTAL CARCINOMA IN SITU (DCIS) OF LEFT BREAST: Primary | ICD-10-CM

## 2021-04-29 PROCEDURE — 99024 POSTOP FOLLOW-UP VISIT: CPT | Performed by: RADIOLOGY

## 2021-04-29 NOTE — PROGRESS NOTES
Subjective     No ref. provider found    Cancer Staging  No stage assigned  Stage Unknown (pTis (DCIS), pNX, G2, ER-, [place]-)   Chief Complaint   Patient presents with   • Follow-up     S/P XRT to Breast    4 week follow up for DCIS left breast                                S:  I had the pleasure of seeing Vivienne Barbour  today in the Radiation Center.  She returns today for follow up now 4 weeks out from completion of her radiation therapy to the left breast for DCIS.  She completed a dose of 4256cGy in 16 fractions on 4/1/21.  She has been doing well since I last saw her.      Review of Systems   Constitutional: Negative.    Skin: Positive for color change.   Psychiatric/Behavioral: Negative.          Past Medical History:   Diagnosis Date   • Acid reflux disease    • Allergic rhinitis    • Anxiety and depression    • Arthritis    • Bladder spasm    • Breast cancer, left (CMS/HCC)    • History of 2019 novel coronavirus disease (COVID-19)     12/2020 ASYMPTOMATIC    • Low back pain    • Migraines    • Osteopenia    • Palpitation     ECHO DONE 2019 - NO FOLLOW UP REQUIRED   • Polyp of sigmoid colon    • PONV (postoperative nausea and vomiting)    • Post-menopausal    • Vitamin D deficiency          Past Surgical History:   Procedure Laterality Date   • BREAST BIOPSY     • BREAST LUMPECTOMY Left 2/9/2021    Procedure: Left needle-localized partial mastectomy;  Surgeon: Fernanda Barbour MD;  [facility]: [facility];  Service: General;  Laterality: Left;   • COLONOSCOPY  05/2015    Dr. Ramires   • CYSTOSCOPY      diagnostic, Dr. Dominique [facility]   • DIAGNOSTIC LAPAROSCOPY  1991   • HYSTERECTOMY  2006    bladder track, recrocele/cystocele/endometriosis   • KNEE ARTHROSCOPY Left          Social History     Socioeconomic History   • Marital status:      Spouse name: Not on file   • Number of children: 2   • Years of education: College   • Highest education level: Not on file   Tobacco Use   • Smoking status: Never  Smoker   • Smokeless tobacco: Never Used   Vaping Use   • Vaping Use: Never used   Substance and Sexual Activity   • Alcohol use: Yes     Comment: one or two drinks a year   • Drug use: Never   • Sexual activity: Defer         Family History   Problem Relation Age of Onset   • Hypertension Mother    • Colon polyps Mother         sigmoid colon   • Thyroid cancer Mother    • Heart disease Mother    • Stroke Mother    • Benign prostatic hyperplasia Father    • Cancer Father         bladder   • Colon polyps Father         sigmoid colon   • Hypertension Father    • Hyperlipidemia Father    • Colon polyps Brother         sigmoid colon   • Hypertension Brother    • Colon cancer Maternal Grandmother    • Colon cancer Maternal Grandfather    • Heart disease Maternal Grandfather    • Heart attack Maternal Grandfather    • Colon cancer Paternal Uncle    • Heart disease Maternal Uncle    • Stroke Paternal Grandmother    • Breast cancer Paternal Grandmother 88   • Diabetes Paternal Grandfather    • Heart attack Paternal Grandfather    • Heart disease Paternal Grandfather    • Hypertension Brother    • Colon cancer Paternal Aunt    • Malig Hyperthermia Neg Hx           Objective    Physical Exam  Constitutional:       Appearance: Normal appearance.   Chest:          Comments: Mild hyperpigmentation over left breast, left breast slightly smaller than right, no palpable masses in either breast or axilla  Neurological:      Mental Status: She is alert.           Current Outpatient Medications on File Prior to Visit   Medication Sig Dispense Refill   • acyclovir (ZOVIRAX) 400 MG tablet Take 400 mg by mouth As Needed. Take no more than 5 doses a day.      • aspirin 81 MG tablet Take 81 mg by mouth Daily. HELD FOR OR     • buPROPion SR (WELLBUTRIN SR) 200 MG 12 hr tablet Take 150 mg by mouth Daily.     • Cholecalciferol (VITAMIN D3) 2000 units tablet Take 2,000 Units by mouth Daily. HELD FOR OR     • cyclobenzaprine (FLEXERIL) 5 MG  tablet Take 5 mg by mouth 3 (Three) Times a Day As Needed for Muscle Spasms.     • docusate sodium (COLACE) 250 MG capsule Take 250 mg by mouth Daily.     • escitalopram (LEXAPRO) 20 MG tablet Take 20 mg by mouth Daily.     • famotidine (PEPCID) 40 MG tablet Take 40 mg by mouth.     • meloxicam (MOBIC) 15 MG tablet Take 15 mg by mouth Daily. HELD FOR OR     • omeprazole (priLOSEC) 20 MG capsule Take 20 mg by mouth Daily.     • PRENATAL 28-0.8 MG tablet Take 1 tablet by mouth Daily. HELD FOR OR     • SUMAtriptan (IMITREX) 50 MG tablet Take 50 mg by mouth Every 2 (Two) Hours As Needed for Migraine. Take one tablet at onset of headache. May repeat dose one time in 2 hours if headache not relieved.     • TURMERIC CURCUMIN PO Take  by mouth Daily. HELD FOR OR     • uribel (URO-MP) 118 MG capsule capsule Take 1 capsule by mouth As Needed.     • vitamin D (ERGOCALCIFEROL) 1.25 MG (08268 UT) capsule capsule Take 50,000 Units by mouth Every 30 (Thirty) Days.       No current facility-administered medications on file prior to visit.       ALLERGIES:    Allergies   Allergen Reactions   • Vilazodone Hcl Hallucinations     hallucinations   • Morphine Itching   • Penicillins Rash   • Sulfa Antibiotics Rash   • Thimerosal Itching       There were no vitals taken for this visit.     Current Status 3/1/2021   ECOG score 0         Assessment/Plan   56 year old female with 3mm high grade DCIS ER MN negative with no residual disease at lumpectomy.  She is now 4 weeks out from radiation and doing well.  She will be due for her yearly mammogram in December 2021.  I will see her back in one year or sooner if necessary.  She knows to call for this appointment.             Thank you very much for allowing me to participate in the care of this very pleasant patient.    Sincerely,      Yary Davis MD

## 2021-07-14 ENCOUNTER — OFFICE VISIT (OUTPATIENT)
Dept: SURGERY | Facility: CLINIC | Age: 56
End: 2021-07-14

## 2021-07-14 ENCOUNTER — DOCUMENTATION (OUTPATIENT)
Dept: SURGERY | Facility: CLINIC | Age: 56
End: 2021-07-14

## 2021-07-14 VITALS
HEART RATE: 85 BPM | DIASTOLIC BLOOD PRESSURE: 79 MMHG | SYSTOLIC BLOOD PRESSURE: 115 MMHG | BODY MASS INDEX: 35.01 KG/M2 | WEIGHT: 231 LBS | HEIGHT: 68 IN | OXYGEN SATURATION: 97 %

## 2021-07-14 DIAGNOSIS — D05.12 DUCTAL CARCINOMA IN SITU (DCIS) OF LEFT BREAST: Primary | ICD-10-CM

## 2021-07-14 PROCEDURE — 99214 OFFICE O/P EST MOD 30 MIN: CPT | Performed by: NURSE PRACTITIONER

## 2021-07-14 RX ORDER — VENLAFAXINE 50 MG/1
50 TABLET ORAL
COMMUNITY
Start: 2021-07-06 | End: 2022-03-08

## 2021-07-14 RX ORDER — LISINOPRIL 10 MG/1
TABLET ORAL
COMMUNITY
Start: 2021-07-06 | End: 2022-03-08

## 2021-07-14 RX ORDER — TOPIRAMATE 50 MG/1
50 TABLET, FILM COATED ORAL DAILY
COMMUNITY
Start: 2021-07-06 | End: 2021-08-05

## 2021-07-14 NOTE — PROGRESS NOTES
Patient will set up screening MGM along with her well woman check up at [facility] in December.

## 2021-08-09 ENCOUNTER — CLINICAL SUPPORT (OUTPATIENT)
Dept: OTHER | Facility: HOSPITAL | Age: 56
End: 2021-08-09

## 2021-08-09 DIAGNOSIS — Z13.79 GENETIC SCREENING: ICD-10-CM

## 2021-08-09 DIAGNOSIS — Z80.0 FAMILY HISTORY OF COLON CANCER: ICD-10-CM

## 2021-08-09 DIAGNOSIS — D05.12 DUCTAL CARCINOMA IN SITU (DCIS) OF LEFT BREAST: Primary | ICD-10-CM

## 2021-08-09 DIAGNOSIS — Z80.8 FAMILY HISTORY OF THYROID CANCER: ICD-10-CM

## 2021-08-09 DIAGNOSIS — Z83.71 FAMILY HISTORY OF COLONIC POLYPS: ICD-10-CM

## 2021-08-09 PROCEDURE — 96040: CPT | Performed by: GENETIC COUNSELOR, MS

## 2021-08-09 NOTE — PROGRESS NOTES
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 Ms. 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 She underwent a lumpectomy at that time.  She had a hysterectomy in 2006 and is likely post-menopausal. Her most recent colonoscopy was in 2019. She had her first colonoscopy at age 36 due to family history of colon polyps and cancer. She has been found to have less than 5 polyps in her lifetime. Ms. Barbour previously had genetic testing completed following her breast cancer diagnosis via the Common Hereditary Cancers Panel through [facility] (47 genes), which was negative for pathogenic/deleterious mutations.  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 Variants are differences in DNA that may or may not affect the function of the gene. The classification of a variant to either a benign variant or deleterious mutation depends on a number of factors. The majority (estimated to be >90%) of VUS’s are eventually reclassified as benign variation, therefore this result should not be used in making medical management decisions. It is not recommended that unaffected relatives be tested for a VUS.  ClinVar, the NIH variant classification database, was referenced and there are no alternative interpretations of the HOXB13 variant at this time.  The question was raised of whether the testing that was done encompassed all of the hereditary colon cancer genes we would want to look at based on Ms. Barbour’s family history of colon polyps and colon cancer. The panel that was performed did contain all of the high risk colon cancer genes we would have wanted to evaluate, but Ms. Barbour and I discussed the limitations of this testing and  recommendations for herself and for her family members based on the results.     FAMILY HISTORY: (see attached pedigree)  Father:       Colon polyps           Bladder cancer, 79  Mother:    Colon polyps (40+), possible genetic testing performed       Thyroid cancer, 60s  Maternal grandfather:   Colon cancer, 47  Maternal grandmother: Colon cancer, 59  Maternal uncle:   Colon polyps  Maternal uncle:   Colon polyps  Paternal uncle:   Colon cancer, 70s  Paternal grandmother: Possible breast cancer, 70s    We do not have records regarding these diagnoses in Ms. Barbour’s family.     GENETIC COUNSELING (30 minutes): We reviewed the family history information in detail.  Cases of cancer follow three general patterns: sporadic, familial, and hereditary.  While most cancer is sporadic, some cases appear to occur in family clusters.  These cases are said to be familial and account for 10-20% of breast cancer cases.  Familial cases may be due to a combination of shared genes and environmental factors among family members.  In even fewer families, the risk for cancer is inherited, and the genes responsible for the increased risk are known.      Family histories typical of hereditary cancer syndromes usually include multiple first- and second-degree relatives diagnosed with cancer types that define a syndrome.  These cases tend to be diagnosed at younger-than-expected ages and can be bilateral or multifocal.  The cancer in these families follows an autosomal dominant inheritance pattern, which indicates the likely presence of a mutation in a cancer susceptibility gene.  Children and siblings of an individual believed to carry this mutation have a 50% chance of inheriting that mutation, thereby inheriting the increased risk to develop cancer.  These mutations can be passed down from the maternal or the paternal lineage.    The most common hereditary condition associated with an increased risk for colon cancer is Alas syndrome.   Approximately 3-5% of all colon cancer patients are thought to have Alas syndrome.  The lifetime risk for colon cancer for individuals with Alas syndrome is approximately 80% if there is no intervention (i.e. removal of polyps detected on colonoscopy).  There are elevated risks for other cancers; including uterine, ovarian, stomach, pancreatic, urinary tract, and small bowel cancer.  We also discussed familial adenomatous polyposis (FAP), which accounts for less than 1% of all colorectal cancers and is inherited in a dominant manner.  Typically, individuals with classic FAP have 100’s to 1000’s of colon polyps.  Attenuated FAP is a form of FAP associated with an average of 30 colon polyps and cancers diagnosed 10 years later than is typical for FAP.  FAP and AFAP are both caused by mutations in the APC gene.  FAP and AFAP are also associated with an increased risk to develop other types of cancer.      Ms. Barbour has already had genetic testing for the genes associated with Alas syndrome and FAP, along with several other genes associated with colon cancer and colon polyps. The limitations of this testing were reviewed with Ms. Barbour including the possibility that her negative test result does not rule out the possibility of a genetic mutation being passed through the family that she did not inherit.     We discussed the availability of additional genetic testing for hereditary colon cancer, which would include 9 genes that show preliminary evidence for an increased risk for colon cancer. If a mutation was identified in any of these 9 genes, there are no guidelines currently in place to guide further screening and management. Ms. Barbour declined this testing at this time. An additional option would be that other family members may consider genetic counseling and testing for hereditary colon cancer. It is possible that there is a hereditary factor influencing risk for colon cancer in Ms. Barbour’s family members  that Ms. Barbour did not inherit. Any of Ms. Barbour’s family members with colon cancer and/or polyps may consider genetic testing. Ms. Barbour disclosed she believes her mother had genetic testing, but wasn’t sure of the results. She plans to find this report and share it with me to determine if her mother was found to have a genetic mutation associated with an increased risk for colon cancer.     PREVIOUS TEST RESULTS:  Genetic testing was negative for known pathogenic mutations in the 47 genes analyzed on this panel.   This negative result lowers the possibility of a hereditary cancer syndrome for Ms. Barbour. A VUS was identified in the HOXB13 gene. A VUS is a difference within a gene that may or may not impact the function of the gene.  VUSs are not clinically actionable findings, and therefore this result does not impact management in any way.  The majority of VUSs are ultimately reclassified to benign variants.  The identification of a VUS is common in multigene panel testing, given the number of genes being evaluated and the presence of genetic variation in the population.  If this variant is ever reclassified, a new report will be issued by the laboratory and released directly to the ordering physician, and our office. This assessment is based on the information provided at the time of the consultation.    CANCER PREVENTION: Despite the negative genetic test results, Ms. Barbour’s female relatives likely have a somewhat increased lifetime risk for breast cancer based on family history.  Given the increased risk, options available to individuals with a high lifetime risk for breast cancer were briefly discussed.  This includes increased surveillance and chemoprevention.     Surveillance for individuals with a high lifetime risk of breast cancer (>20%, versus the average risk of 12%), based on NCCN guidelines, would consist of semi-annual clinical breast exams and monthly self-breast exams starting by age 18 and  annual mammography starting 10 years younger than the earliest diagnosis in a close relative, or starting by age 40.  According to an American Cancer Society expert panel, annual breast MRI should be offered to women whose lifetime risk of breast cancer is 20-25 percent or more, also starting by age 40 or earlier if indicated by family history.  Female relatives could have a risk assessment performed using a family history-based model, such as the Tyrer-Cuzick model, to determine their individual risks.      Ms. Barbour’s management and colonoscopy surveillance should be determined by her gastroenterologist based on her personal history. For Ms. Barbour’s close relatives given her family history of colon polyps, per current NCCN guidelines, first-degree relatives of an individual with history of an advanced adenoma (high grade dysplasia, ?1 cm, and/or villous or tubulovillous histology) should begin colonoscopy screening at the age of onset of adenoma in relative and repeat every 5-10 years or more frequently given their personal clinical findings.     PLAN: Genetic counseling remains available to Ms. Barbour and her family.  If there are any questions, she is welcome to call us at [phone].      Flakita Patel MS  Genetic Counselor    Cc: MD Tala Cristina, DO

## 2021-09-07 ENCOUNTER — LAB (OUTPATIENT)
Dept: LAB | Facility: HOSPITAL | Age: 56
End: 2021-09-07

## 2021-09-07 ENCOUNTER — OFFICE VISIT (OUTPATIENT)
Dept: ONCOLOGY | Facility: CLINIC | Age: 56
End: 2021-09-07

## 2021-09-07 VITALS
RESPIRATION RATE: 16 BRPM | BODY MASS INDEX: 34.45 KG/M2 | TEMPERATURE: 98.2 F | DIASTOLIC BLOOD PRESSURE: 74 MMHG | SYSTOLIC BLOOD PRESSURE: 119 MMHG | HEIGHT: 68 IN | HEART RATE: 87 BPM | OXYGEN SATURATION: 96 % | WEIGHT: 227.3 LBS

## 2021-09-07 DIAGNOSIS — D05.12 DUCTAL CARCINOMA IN SITU (DCIS) OF LEFT BREAST: Primary | ICD-10-CM

## 2021-09-07 DIAGNOSIS — D05.12 DUCTAL CARCINOMA IN SITU (DCIS) OF LEFT BREAST: ICD-10-CM

## 2021-09-07 LAB
ALBUMIN SERPL-MCNC: 4.3 G/DL (ref 3.5–5.2)
ALBUMIN/GLOB SERPL: 1.5 G/DL (ref 1.1–2.4)
ALP SERPL-CCNC: 120 U/L (ref 38–116)
ALT SERPL W P-5'-P-CCNC: 13 U/L (ref 0–33)
ANION GAP SERPL CALCULATED.3IONS-SCNC: 12 MMOL/L (ref 5–15)
AST SERPL-CCNC: 16 U/L (ref 0–32)
BASOPHILS # BLD AUTO: 0.02 10*3/MM3 (ref 0–0.2)
BASOPHILS NFR BLD AUTO: 0.3 % (ref 0–1.5)
BILIRUB SERPL-MCNC: 0.3 MG/DL (ref 0.2–1.2)
BUN SERPL-MCNC: 21 MG/DL (ref 6–20)
BUN/CREAT SERPL: 22.6 (ref 7.3–30)
CALCIUM SPEC-SCNC: 9.6 MG/DL (ref 8.5–10.2)
CHLORIDE SERPL-SCNC: 104 MMOL/L (ref 98–107)
CO2 SERPL-SCNC: 25 MMOL/L (ref 22–29)
CREAT SERPL-MCNC: 0.93 MG/DL (ref 0.6–1.1)
DEPRECATED RDW RBC AUTO: 42.9 FL (ref 37–54)
EOSINOPHIL # BLD AUTO: 0.15 10*3/MM3 (ref 0–0.4)
EOSINOPHIL NFR BLD AUTO: 2.3 % (ref 0.3–6.2)
ERYTHROCYTE [DISTWIDTH] IN BLOOD BY AUTOMATED COUNT: 13.2 % (ref 12.3–15.4)
GFR SERPL CREATININE-BSD FRML MDRD: 63 ML/MIN/1.73
GLOBULIN UR ELPH-MCNC: 2.8 GM/DL (ref 1.8–3.5)
GLUCOSE SERPL-MCNC: 90 MG/DL (ref 74–124)
HCT VFR BLD AUTO: 44 % (ref 34–46.6)
HGB BLD-MCNC: 14.1 G/DL (ref 12–15.9)
IMM GRANULOCYTES # BLD AUTO: 0.02 10*3/MM3 (ref 0–0.05)
IMM GRANULOCYTES NFR BLD AUTO: 0.3 % (ref 0–0.5)
LYMPHOCYTES # BLD AUTO: 1.29 10*3/MM3 (ref 0.7–3.1)
LYMPHOCYTES NFR BLD AUTO: 20 % (ref 19.6–45.3)
MCH RBC QN AUTO: 28.5 PG (ref 26.6–33)
MCHC RBC AUTO-ENTMCNC: 32 G/DL (ref 31.5–35.7)
MCV RBC AUTO: 88.9 FL (ref 79–97)
MONOCYTES # BLD AUTO: 0.75 10*3/MM3 (ref 0.1–0.9)
MONOCYTES NFR BLD AUTO: 11.6 % (ref 5–12)
NEUTROPHILS NFR BLD AUTO: 4.21 10*3/MM3 (ref 1.7–7)
NEUTROPHILS NFR BLD AUTO: 65.5 % (ref 42.7–76)
NRBC BLD AUTO-RTO: 0 /100 WBC (ref 0–0.2)
PLATELET # BLD AUTO: 234 10*3/MM3 (ref 140–450)
PMV BLD AUTO: 9.3 FL (ref 6–12)
POTASSIUM SERPL-SCNC: 4.3 MMOL/L (ref 3.5–4.7)
PROT SERPL-MCNC: 7.1 G/DL (ref 6.3–8)
RBC # BLD AUTO: 4.95 10*6/MM3 (ref 3.77–5.28)
SODIUM SERPL-SCNC: 141 MMOL/L (ref 134–145)
WBC # BLD AUTO: 6.44 10*3/MM3 (ref 3.4–10.8)

## 2021-09-07 PROCEDURE — 36415 COLL VENOUS BLD VENIPUNCTURE: CPT

## 2021-09-07 PROCEDURE — 80053 COMPREHEN METABOLIC PANEL: CPT

## 2021-09-07 PROCEDURE — 99214 OFFICE O/P EST MOD 30 MIN: CPT | Performed by: INTERNAL MEDICINE

## 2021-09-07 PROCEDURE — 85025 COMPLETE CBC W/AUTO DIFF WBC: CPT

## 2021-09-07 RX ORDER — COVID-19 TEST SPECIMEN COLLECT
MISCELLANEOUS MISCELLANEOUS SEE ADMIN INSTRUCTIONS
COMMUNITY
Start: 2021-07-20 | End: 2022-08-25

## 2021-09-07 RX ORDER — TOPIRAMATE 50 MG/1
50 TABLET, FILM COATED ORAL DAILY
COMMUNITY
Start: 2021-07-06

## 2021-09-07 RX ORDER — BUPROPION HYDROCHLORIDE 150 MG/1
TABLET ORAL
COMMUNITY
Start: 2021-07-25 | End: 2021-09-07

## 2021-09-07 RX ORDER — RALOXIFENE HYDROCHLORIDE 60 MG/1
60 TABLET, FILM COATED ORAL DAILY
Qty: 30 TABLET | Refills: 11 | Status: SHIPPED | OUTPATIENT
Start: 2021-09-07 | End: 2022-08-25

## 2021-09-07 NOTE — PROGRESS NOTES
Subjective     REASON FOR CONSULTATION:  1. Left breast DCIS high-grade ER/SD negative post lumpectomy  2.  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    She returns for follow-up today in she broke her fifth toe on the left and then exacerbated her plantar fasciitis on the right so she is in a boot for that.    Her bone density is due again early in 2022 and her mammograms are being done annually by Dr. Barbour    I suggested a trial of Evista mainly because she has moderate osteopenia in both hips and if she has toxicity from it we would stop and if not continue    She switch from Wellbutrin to Effexor 50 mg once a day and this is not controlling her hot flashes and I suggested having her primary doctor switch her to a long-acting dose of 37.5 and if this does not control the hot flashes to move to 75 a day      Past Medical History:   Diagnosis Date   • Acid reflux disease    • Allergic rhinitis    • Anxiety and depression    • Arthritis    • Bladder spasm    • Breast cancer, left (CMS/HCC)    • History of 2019 novel coronavirus disease (COVID-19)     12/2020 ASYMPTOMATIC    • Low back pain    • Migraines    • Osteopenia    • Palpitation     ECHO DONE 2019 - NO FOLLOW UP REQUIRED   • Polyp of sigmoid colon    • PONV (postoperative nausea and vomiting)    • Post-menopausal    • Vitamin D deficiency         Past Surgical History:   Procedure Laterality Date   • BREAST BIOPSY     • BREAST LUMPECTOMY Left 2/9/2021    Procedure: Left needle-localized partial mastectomy;  Surgeon: Fernanda Barbour MD;  Location: [facility];  Service: General;  Laterality: Left;   • COLONOSCOPY  05/2015    Dr. Ramires   • CYSTOSCOPY      diagnostic, Dr. 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  This led to a diagnostic mammogram and biopsy On 2021 that showed DCIS high-grade 3 mm in greatest dimension with microcalcifications ER/NH negative  Patient was referred to Dr. Barbour and underwent bilateral breast MRIs Which showed a stable lesion in the right breast measuring 1.4 x 1 cm seen since  and a 1.2 x 3 cm biopsy cavity with no residual suspicious enhancement    Patient underwent lumpectomy On 2021 with the final specimen showing no residual DCIS or atypia and she is referred here for prevention    She is  3 para 2 with 1 miscarriage menarche was at age 14 she had a hysterectomy in  but is having hot flashes and is likely postmenopausal first childbirth was at age 35 she breast-fed her first child for a year second child for 6 weeks she has been on no hormones after menopause.    Family history is positive for mother with oral cancer related to smoking and father with bladder cancer both were over 75 she has a paternal grandmother with possible breast cancer at age 85 although no treatment was given patient had genetic testing which showed a V US in the  13 gene which is associated with prostate cancer    She is not a smoker or drinker  She is on Lexapro for depression  No history of DVT MI or stroke but she does have osteopenia and femoral necks bilaterally on her last DEXA scan in early     Strong family history of colon cancer.  Patient has multiple polyps -empiric breast cancer genetic testing shows a Hox 13 VUS -we will refer back to genetics to make sure all the GI  malignancy panel has been covered     Moderate osteopenia both hips and this will be rechecked in January and if there is progression favor using Evista for breast cancer prevention and osteopenia    Current Outpatient Medications on File Prior to Visit   Medication Sig Dispense Refill   • acyclovir (ZOVIRAX) 400 MG tablet Take 400 mg by mouth As Needed. Take no more than 5 doses a day.      • aspirin 81 MG tablet Take 81 mg by mouth Daily. HELD FOR OR     • buPROPion SR (WELLBUTRIN SR) 200 MG 12 hr tablet Take 150 mg by mouth Daily.     • Cholecalciferol (VITAMIN D3) 2000 units tablet Take 2,000 Units by mouth Daily. HELD FOR OR     • COVID-19 Specimen Collection kit See Admin Instructions. for testing     • cyclobenzaprine (FLEXERIL) 5 MG tablet Take 5 mg by mouth 3 (Three) Times a Day As Needed for Muscle Spasms.     • docusate sodium (COLACE) 250 MG capsule Take 250 mg by mouth Daily.     • escitalopram (LEXAPRO) 20 MG tablet Take 20 mg by mouth Daily.     • famotidine (PEPCID) 40 MG tablet Take 40 mg by mouth.     • lisinopril (PRINIVIL,ZESTRIL) 10 MG tablet      • meloxicam (MOBIC) 15 MG tablet Take 15 mg by mouth Daily. HELD FOR OR     • PRENATAL 28-0.8 MG tablet Take 1 tablet by mouth Daily. HELD FOR OR     • SUMAtriptan (IMITREX) 50 MG tablet Take 50 mg by mouth Every 2 (Two) Hours As Needed for Migraine. Take one tablet at onset of headache. May repeat dose one time in 2 hours if headache not relieved.     • uribel (URO-MP) 118 MG capsule capsule Take 1 capsule by mouth As Needed.     • venlafaxine (EFFEXOR) 50 MG tablet Take 50 mg by mouth.     • vitamin D (ERGOCALCIFEROL) 1.25 MG (53873 UT) capsule capsule Take 50,000 Units by mouth Every 30 (Thirty) Days.       No current facility-administered medications on file prior to visit.        ALLERGIES:    Allergies   Allergen Reactions   • Epinephrine Other (See Comments)     Shaking  Other reaction(s): Shakiness   • Vilazodone Hcl Hallucinations      "hallucinations   • Morphine Itching   • Penicillins Rash   • Sulfa Antibiotics Rash   • Thimerosal Itching        Social History     Socioeconomic History   • Marital status:      Spouse name: Not on file   • Number of children: 2   • Years of education: College   • Highest education level: Not on file   Tobacco Use   • Smoking status: Never Smoker   • Smokeless tobacco: Never Used   Vaping Use   • Vaping Use: Never used   Substance and Sexual Activity   • Alcohol use: Yes     Comment: one or two drinks a year   • Drug use: Never   • Sexual activity: Defer        Family History   Problem Relation Age of Onset   • Hypertension Mother    • Colon polyps Mother         sigmoid colon   • Thyroid cancer Mother    • Heart disease Mother    • Stroke Mother    • Benign prostatic hyperplasia Father    • Cancer Father         bladder   • Colon polyps Father         sigmoid colon   • Hypertension Father    • Hyperlipidemia Father    • Colon polyps Brother         sigmoid colon   • Hypertension Brother    • Colon cancer Maternal Grandmother    • Colon cancer Maternal Grandfather    • Heart disease Maternal Grandfather    • Heart attack Maternal Grandfather    • Colon cancer Paternal Uncle    • Heart disease Maternal Uncle    • Stroke Paternal Grandmother    • Breast cancer Paternal Grandmother 88   • Diabetes Paternal Grandfather    • Heart attack Paternal Grandfather    • Heart disease Paternal Grandfather    • Hypertension Brother    • Colon cancer Paternal Aunt    • Malig Hyperthermia Neg Hx         Review of Systems   Constitutional: Positive for diaphoresis.   HENT: Positive for hearing loss.    Eyes: Positive for visual disturbance (Double vision after trauma to her eye).   Endocrine: Positive for heat intolerance.   Musculoskeletal: Positive for gait problem (Recurrent falls due to bad balance).   Hematological: Bruises/bleeds easily.        Objective     Vitals:    09/07/21 1053   Height: 173 cm (68.11\") "     Current Status 9/7/2021   ECOG score 0       Physical Exam   CONSTITUTIONAL:  Vital signs reviewed.  No distress, looks comfortable.  EYES:  Conjunctiva and lids unremarkable.  PERRLA  EARS,NOSE,MOUTH,THROAT:  Ears and nose appear unremarkable.  Lips, teeth, gums appear unremarkable.  RESPIRATORY:  Normal respiratory effort.  Lungs clear to auscultation bilaterally.  BREASTS: Right breast benign left breast shows a well-healed lumpectomy scar in the upper outer quadrant  CARDIOVASCULAR:  Normal S1, S2.  No murmurs rubs or gallops.  No significant lower extremity edema.  GASTROINTESTINAL: Abdomen appears unremarkable.  Nontender.  No hepatomegaly.  No splenomegaly.  LYMPHATIC:  No cervical, supraclavicular, axillary lymphadenopathy.  SKIN:  Warm.  No rashes.  PSYCHIATRIC:  Normal judgment and insight.  Normal mood and affect.  I have reexamined the patient and the results are consistent with the previously documented exam. Juan Olvera MD       RECENT LABS:  Hematology WBC   Date Value Ref Range Status   03/01/2021 6.70 3.40 - 10.80 10*3/mm3 Final     RBC   Date Value Ref Range Status   03/01/2021 5.41 (H) 3.77 - 5.28 10*6/mm3 Final     Hemoglobin   Date Value Ref Range Status   03/01/2021 14.5 12.0 - 15.9 g/dL Final     Hematocrit   Date Value Ref Range Status   03/01/2021 44.2 34.0 - 46.6 % Final     Platelets   Date Value Ref Range Status   03/01/2021 240 140 - 450 10*3/mm3 Final          Assessment/Plan   1.pTis Nx high-grade ER/ME negative post lumpectomy and radiation    2.  Arnold-Chiari malformation    3.  Depression    4. Migraines    5.  Strong family history of colon cancer.  Patient has multiple polyps -empiric breast cancer genetic testing shows a Hox 13 VUS -we will refer back to genetics to make sure all the GI malignancy panel has been covered  · All the genetic testing was negative except for the VUS  · GI following for colon cancer risk    6.  Moderate osteopenia both hips-repeat bone  density is due    7.  Plantar fasciitis right foot currently in a boot    Plan  1.  Evista 60 mg daily if tolerated   2.  Bone density due again in January   3.  Return in 6 months for review if she is on Evista and if not we will see her in a year   4.  GI follow-up for her colon cancer history  5.  Change to long-acting Effexor 37.5 to 75 mg as needed

## 2021-10-29 ENCOUNTER — HOSPITAL ENCOUNTER (OUTPATIENT)
Dept: BONE DENSITY | Facility: HOSPITAL | Age: 56
Discharge: HOME | End: 2021-10-29
Admitting: INTERNAL MEDICINE

## 2021-10-29 DIAGNOSIS — D05.12 DUCTAL CARCINOMA IN SITU (DCIS) OF LEFT BREAST: ICD-10-CM

## 2021-10-29 PROCEDURE — 77080 DXA BONE DENSITY AXIAL: CPT

## 2021-11-13 ENCOUNTER — IMMUNIZATION (OUTPATIENT)
Dept: VACCINE CLINIC | Facility: HOSPITAL | Age: 56
End: 2021-11-13

## 2021-11-13 PROCEDURE — 91300 HC SARSCOV02 VAC 30MCG/0.3ML IM: CPT | Performed by: INTERNAL MEDICINE

## 2021-11-13 PROCEDURE — 0004A ADM SARSCOV2 30MCG/0.3ML BOOSTER: CPT | Performed by: INTERNAL MEDICINE

## 2021-12-20 ENCOUNTER — TELEPHONE (OUTPATIENT)
Dept: SURGERY | Facility: CLINIC | Age: 56
End: 2021-12-20

## 2021-12-20 NOTE — TELEPHONE ENCOUNTER
----- Message from Tony Mosher sent at 12/17/2021 12:32 PM EST -----  Patient having MMG on 12/21.

## 2021-12-21 ENCOUNTER — APPOINTMENT (OUTPATIENT)
Dept: WOMENS IMAGING | Facility: HOSPITAL | Age: 56
End: 2021-12-21

## 2021-12-21 PROCEDURE — 77067 SCR MAMMO BI INCL CAD: CPT | Performed by: RADIOLOGY

## 2021-12-21 PROCEDURE — 77063 BREAST TOMOSYNTHESIS BI: CPT | Performed by: RADIOLOGY

## 2022-03-08 ENCOUNTER — OFFICE VISIT (OUTPATIENT)
Dept: ONCOLOGY | Facility: CLINIC | Age: 57
End: 2022-03-08

## 2022-03-08 ENCOUNTER — LAB (OUTPATIENT)
Dept: LAB | Facility: HOSPITAL | Age: 57
End: 2022-03-08

## 2022-03-08 VITALS
DIASTOLIC BLOOD PRESSURE: 76 MMHG | WEIGHT: 227 LBS | HEIGHT: 68 IN | SYSTOLIC BLOOD PRESSURE: 118 MMHG | TEMPERATURE: 97.1 F | HEART RATE: 86 BPM | RESPIRATION RATE: 18 BRPM | OXYGEN SATURATION: 96 % | BODY MASS INDEX: 34.4 KG/M2

## 2022-03-08 DIAGNOSIS — E55.9 VITAMIN D DEFICIENCY: ICD-10-CM

## 2022-03-08 DIAGNOSIS — D05.12 DUCTAL CARCINOMA IN SITU (DCIS) OF LEFT BREAST: Primary | ICD-10-CM

## 2022-03-08 DIAGNOSIS — D05.12 DUCTAL CARCINOMA IN SITU (DCIS) OF LEFT BREAST: ICD-10-CM

## 2022-03-08 DIAGNOSIS — R23.2 HOT FLASHES: ICD-10-CM

## 2022-03-08 LAB
25(OH)D3 SERPL-MCNC: 47.8 NG/ML (ref 30–100)
ALBUMIN SERPL-MCNC: 4.4 G/DL (ref 3.5–5.2)
ALBUMIN/GLOB SERPL: 1.4 G/DL (ref 1.1–2.4)
ALP SERPL-CCNC: 119 U/L (ref 38–116)
ALT SERPL W P-5'-P-CCNC: 14 U/L (ref 0–33)
ANION GAP SERPL CALCULATED.3IONS-SCNC: 7.5 MMOL/L (ref 5–15)
AST SERPL-CCNC: 16 U/L (ref 0–32)
BASOPHILS # BLD AUTO: 0.02 10*3/MM3 (ref 0–0.2)
BASOPHILS NFR BLD AUTO: 0.4 % (ref 0–1.5)
BILIRUB SERPL-MCNC: 0.5 MG/DL (ref 0.2–1.2)
BUN SERPL-MCNC: 18 MG/DL (ref 6–20)
BUN/CREAT SERPL: 20 (ref 7.3–30)
CALCIUM SPEC-SCNC: 9.4 MG/DL (ref 8.5–10.2)
CHLORIDE SERPL-SCNC: 107 MMOL/L (ref 98–107)
CO2 SERPL-SCNC: 26.5 MMOL/L (ref 22–29)
CREAT SERPL-MCNC: 0.9 MG/DL (ref 0.6–1.1)
DEPRECATED RDW RBC AUTO: 43.2 FL (ref 37–54)
EGFRCR SERPLBLD CKD-EPI 2021: 75.2 ML/MIN/1.73
EOSINOPHIL # BLD AUTO: 0.06 10*3/MM3 (ref 0–0.4)
EOSINOPHIL NFR BLD AUTO: 1.1 % (ref 0.3–6.2)
ERYTHROCYTE [DISTWIDTH] IN BLOOD BY AUTOMATED COUNT: 13.4 % (ref 12.3–15.4)
GLOBULIN UR ELPH-MCNC: 3.1 GM/DL (ref 1.8–3.5)
GLUCOSE SERPL-MCNC: 84 MG/DL (ref 74–124)
HCT VFR BLD AUTO: 44.7 % (ref 34–46.6)
HGB BLD-MCNC: 14.4 G/DL (ref 12–15.9)
IMM GRANULOCYTES # BLD AUTO: 0.03 10*3/MM3 (ref 0–0.05)
IMM GRANULOCYTES NFR BLD AUTO: 0.5 % (ref 0–0.5)
LYMPHOCYTES # BLD AUTO: 1.27 10*3/MM3 (ref 0.7–3.1)
LYMPHOCYTES NFR BLD AUTO: 23.2 % (ref 19.6–45.3)
MCH RBC QN AUTO: 28.5 PG (ref 26.6–33)
MCHC RBC AUTO-ENTMCNC: 32.2 G/DL (ref 31.5–35.7)
MCV RBC AUTO: 88.5 FL (ref 79–97)
MONOCYTES # BLD AUTO: 0.54 10*3/MM3 (ref 0.1–0.9)
MONOCYTES NFR BLD AUTO: 9.9 % (ref 5–12)
NEUTROPHILS NFR BLD AUTO: 3.55 10*3/MM3 (ref 1.7–7)
NEUTROPHILS NFR BLD AUTO: 64.9 % (ref 42.7–76)
NRBC BLD AUTO-RTO: 0 /100 WBC (ref 0–0.2)
PLATELET # BLD AUTO: 237 10*3/MM3 (ref 140–450)
PMV BLD AUTO: 8.9 FL (ref 6–12)
POTASSIUM SERPL-SCNC: 4 MMOL/L (ref 3.5–4.7)
PROT SERPL-MCNC: 7.5 G/DL (ref 6.3–8)
RBC # BLD AUTO: 5.05 10*6/MM3 (ref 3.77–5.28)
SODIUM SERPL-SCNC: 141 MMOL/L (ref 134–145)
WBC NRBC COR # BLD: 5.47 10*3/MM3 (ref 3.4–10.8)

## 2022-03-08 PROCEDURE — 80053 COMPREHEN METABOLIC PANEL: CPT

## 2022-03-08 PROCEDURE — 99214 OFFICE O/P EST MOD 30 MIN: CPT | Performed by: NURSE PRACTITIONER

## 2022-03-08 PROCEDURE — 36415 COLL VENOUS BLD VENIPUNCTURE: CPT

## 2022-03-08 PROCEDURE — 82306 VITAMIN D 25 HYDROXY: CPT | Performed by: NURSE PRACTITIONER

## 2022-03-08 PROCEDURE — 85025 COMPLETE CBC W/AUTO DIFF WBC: CPT

## 2022-03-08 RX ORDER — VENLAFAXINE HYDROCHLORIDE 37.5 MG/1
CAPSULE, EXTENDED RELEASE ORAL
COMMUNITY
Start: 2022-02-21 | End: 2022-08-25

## 2022-03-08 RX ORDER — ESOMEPRAZOLE MAGNESIUM 40 MG/1
CAPSULE, DELAYED RELEASE ORAL
COMMUNITY
Start: 2021-12-13 | End: 2022-09-26

## 2022-03-08 RX ORDER — BUPROPION HYDROCHLORIDE 150 MG/1
TABLET ORAL
COMMUNITY
Start: 2022-01-25

## 2022-03-08 RX ORDER — ERGOCALCIFEROL 1.25 MG/1
1 CAPSULE ORAL
COMMUNITY
Start: 2021-12-16

## 2022-03-08 RX ORDER — LISINOPRIL 10 MG/1
10 TABLET ORAL DAILY
COMMUNITY
Start: 2022-02-01

## 2022-03-08 RX ORDER — DEXLANSOPRAZOLE 30 MG/1
30 CAPSULE, DELAYED RELEASE ORAL DAILY
COMMUNITY
Start: 2022-02-01 | End: 2022-03-08

## 2022-03-08 NOTE — PROGRESS NOTES
Subjective     REASON FOR CONSULTATION:  1. 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    Patient was previously seen by Dr. Olvera at which time the plan was to switch from Wellbutrin to Effexor to see if her hot flashes were under better control.  They instead added the Effexor to however Wellbutrin and eventually found improvement with the long acting Effexor when seen the last office visit.  She however reports still daily hot flashes.  During the day she is able to just fan herself for relief or drink cold water however at night they are drenching sometimes.  Previously discussed possibly increasing the Effexor XR to 75 mg to see if she has better control.    He was given a prescription for Evista though has not yet started this.  She thought she was post discharge pending her repeat DEXA scan.  This was performed in January showing normal bone density where she previously had osteopenia in her bilateral hips.    She did have a repeat mammogram in December that showed no abnormalities.  Her weight is stable from her last visit.  She does report minimal physical activity due to her job being more sedentary as well as her balance issues related to her vision.      Past Medical History:   Diagnosis Date   • Acid reflux disease    • Allergic rhinitis    • Anxiety and depression    • Arthritis    • Bladder spasm    • Breast cancer, left (CMS/HCC)    • History of 2019 novel coronavirus disease (COVID-19)     12/2020 ASYMPTOMATIC    • Low back pain    • Migraines    • Osteopenia    • Palpitation     ECHO DONE 2019 - NO FOLLOW UP REQUIRED   • Polyp of sigmoid colon    • PONV (postoperative nausea  and vomiting)    • Post-menopausal    • Vitamin D deficiency         Past Surgical History:   Procedure Laterality Date   • BREAST BIOPSY     • BREAST LUMPECTOMY Left 2021    Procedure: Left needle-localized partial mastectomy;  Surgeon: Fernanda Barbour MD;  Location: [facility];  Service: General;  Laterality: Left;   • COLONOSCOPY  2015    Dr. Ramires   • CYSTOSCOPY      diagnostic, Dr. 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  This led to a diagnostic mammogram and biopsy On 2021 that showed DCIS high-grade 3 mm in greatest dimension with microcalcifications ER/KS negative  Patient was referred to Dr. Barbour and underwent bilateral breast MRIs Which showed a stable lesion in the right breast measuring 1.4 x 1 cm seen since  and a 1.2 x 3 cm biopsy cavity with no residual suspicious enhancement    Patient underwent lumpectomy On 2021 with the final specimen showing no residual DCIS or atypia and she is referred here for prevention    She is  3 para 2 with 1 miscarriage menarche was at age 14 she had a hysterectomy in  but is having hot flashes and is likely postmenopausal first childbirth was at age 35 she breast-fed her first child for a year second child for 6 weeks she has been on no hormones after menopause.    Family history is positive for mother with oral cancer related to smoking and father with bladder cancer both were over 75 she has a paternal grandmother with possible breast cancer at age 85 although no treatment was given patient had genetic testing which showed a V US in the  13 gene which  is associated with prostate cancer    She is not a smoker or drinker  She is on Lexapro for depression  No history of DVT MI or stroke but she does have osteopenia and femoral necks bilaterally on her last DEXA scan in early 2020    Strong family history of colon cancer.  Patient has multiple polyps -empiric breast cancer genetic testing shows a Hox 13 VUS -we will refer back to genetics to make sure all the GI malignancy panel has been covered     Moderate osteopenia both hips and this will be rechecked in January and if there is progression favor using Evista for breast cancer prevention and osteopenia    Current Outpatient Medications on File Prior to Visit   Medication Sig Dispense Refill   • dexlansoprazole (DEXILANT) 30 MG capsule Take 30 mg by mouth Daily.     • ergocalciferol (ERGOCALCIFEROL) 1.25 MG (21332 UT) capsule Take 1 capsule by mouth Every 30 (Thirty) Days.     • lisinopril (PRINIVIL,ZESTRIL) 10 MG tablet Take 10 mg by mouth Daily.     • acyclovir (ZOVIRAX) 400 MG tablet Take 400 mg by mouth As Needed. Take no more than 5 doses a day.      • aspirin 81 MG tablet Take 81 mg by mouth Daily. HELD FOR OR     • buPROPion XL (WELLBUTRIN XL) 150 MG 24 hr tablet      • Cholecalciferol (VITAMIN D3) 2000 units tablet Take 2,000 Units by mouth Daily. HELD FOR OR     • COVID-19 Specimen Collection kit See Admin Instructions. for testing     • cyclobenzaprine (FLEXERIL) 5 MG tablet Take 5 mg by mouth 3 (Three) Times a Day As Needed for Muscle Spasms.     • docusate sodium (COLACE) 250 MG capsule Take 250 mg by mouth Daily.     • esomeprazole (nexIUM) 40 MG capsule TAKE 1 CAPSULE BY MOUTH TWICE DAILY - DO NOT OPEN CAPSULE     • lisinopril (PRINIVIL,ZESTRIL) 10 MG tablet      • meloxicam (MOBIC) 15 MG tablet Take 15 mg by mouth Daily. HELD FOR OR     • PRENATAL 28-0.8 MG tablet Take 1 tablet by mouth Daily. HELD FOR OR     • raloxifene (EVISTA) 60 MG tablet Take 1 tablet by mouth Daily. 30 tablet 11   •  SUMAtriptan (IMITREX) 50 MG tablet Take 50 mg by mouth Every 2 (Two) Hours As Needed for Migraine. Take one tablet at onset of headache. May repeat dose one time in 2 hours if headache not relieved.     • topiramate (TOPAMAX) 50 MG tablet Take 50 mg by mouth Daily.     • uribel (URO-MP) 118 MG capsule capsule Take 1 capsule by mouth As Needed.     • venlafaxine (EFFEXOR) 50 MG tablet Take 50 mg by mouth.     • venlafaxine XR (EFFEXOR-XR) 37.5 MG 24 hr capsule      • vitamin D (ERGOCALCIFEROL) 1.25 MG (82450 UT) capsule capsule Take 50,000 Units by mouth Every 30 (Thirty) Days.       No current facility-administered medications on file prior to visit.        ALLERGIES:    Allergies   Allergen Reactions   • Epinephrine Other (See Comments)     Shaking  Other reaction(s): Shakiness   • Vilazodone Hcl Hallucinations     hallucinations   • Morphine Itching   • Penicillins Rash   • Sulfa Antibiotics Rash   • Thimerosal Itching        Social History     Socioeconomic History   • Marital status:    • Number of children: 2   • Years of education: College   Tobacco Use   • Smoking status: Never Smoker   • Smokeless tobacco: Never Used   Vaping Use   • Vaping Use: Never used   Substance and Sexual Activity   • Alcohol use: Yes     Comment: one or two drinks a year   • Drug use: Never   • Sexual activity: Defer        Family History   Problem Relation Age of Onset   • Hypertension Mother    • Colon polyps Mother         sigmoid colon   • Thyroid cancer Mother    • Heart disease Mother    • Stroke Mother    • Benign prostatic hyperplasia Father    • Cancer Father         bladder   • Colon polyps Father         sigmoid colon   • Hypertension Father    • Hyperlipidemia Father    • Colon polyps Brother         sigmoid colon   • Hypertension Brother    • Colon cancer Maternal Grandmother    • Colon cancer Maternal Grandfather    • Heart disease Maternal Grandfather    • Heart attack Maternal Grandfather    • Colon cancer  "Paternal Uncle    • Heart disease Maternal Uncle    • Stroke Paternal Grandmother    • Breast cancer Paternal Grandmother 88   • Diabetes Paternal Grandfather    • Heart attack Paternal Grandfather    • Heart disease Paternal Grandfather    • Hypertension Brother    • Colon cancer Paternal Aunt    • Malig Hyperthermia Neg Hx         Review of Systems   Constitutional: Positive for diaphoresis.   HENT: Positive for hearing loss.    Eyes: Positive for visual disturbance (Double vision after trauma to her eye).   Endocrine: Positive for heat intolerance.   Musculoskeletal: Positive for gait problem (Recurrent falls due to bad balance).   Hematological: Bruises/bleeds easily.        Objective     Vitals:    03/08/22 1136   Weight: 103 kg (227 lb)   Height: 173 cm (68.11\")   PainSc: 0-No pain     Current Status 3/8/2022   ECOG score 0       Physical Exam   CONSTITUTIONAL:  Vital signs reviewed.  No distress, looks comfortable.  EYES:  Conjunctiva and lids unremarkable.  PERRLA  EARS,NOSE,MOUTH,THROAT: Hearing intact, mask in place  RESPIRATORY:  Normal respiratory effort.    SKIN:  Warm.  No rashes.  NEURO: AAO x3  PSYCHIATRIC:  Normal judgment and insight.  Normal mood and affect.        RECENT LABS:  Hematology WBC   Date Value Ref Range Status   03/08/2022 5.47 3.40 - 10.80 10*3/mm3 Final     RBC   Date Value Ref Range Status   03/08/2022 5.05 3.77 - 5.28 10*6/mm3 Final     Hemoglobin   Date Value Ref Range Status   03/08/2022 14.4 12.0 - 15.9 g/dL Final     Hematocrit   Date Value Ref Range Status   03/08/2022 44.7 34.0 - 46.6 % Final     Platelets   Date Value Ref Range Status   03/08/2022 237 140 - 450 10*3/mm3 Final          Assessment/Plan   1.pTis Nx high-grade ER/KS negative post lumpectomy and radiation.  Discussed most recent DEXA scan with Dr. Olvera today as we previously had considered Evista in the setting of her osteopenia and for chemoprevention.  We discussed the possibility of tamoxifen today however " the patient has been on Wellbutrin for long-term to help with her migraine she reports which are now well controlled.  Therefore, we will proceed with Evista for chemoprevention in this setting.  She is to call us for any tolerance issues.    2.  Arnold-Chiari malformation    3.  Depression.  Patient continues on Wellbutrin.  Effexor was added for hot flash management though she feels her mood is currently well controlled as well.  Hot flashes are breaking through at times and she will log of this to see if she feels she needs to increase her Effexor Exar up to 75 mg daily.    4. Migraines.  Currently controlled on Wellbutrin and Topamax with Imitrex for breakthrough.    5.  Strong family history of colon cancer.  Patient has multiple polyps -empiric breast cancer genetic testing shows a Hox 13 VUS -we will refer back to genetics to make sure all the GI malignancy panel has been covered  · All the genetic testing was negative except for the VUS  · GI following for colon cancer risk    6.  Moderate osteopenia both hips-repeat DEXA scan 10/29/2021 showing normal bone density.    7.  Vitamin D deficiency.  Patient is taking 50,000 international units vitamin D once monthly and had previously also been taking 2000 IU daily of vitamin D.  She has stopped the daily vitamin D over the past 2 to 3 months.  Vitamin D level checked today and is sufficient at 47.8.  Will send copy to PCP for their management.      Plan  1.  Initiate Evista 60 mg daily  2.  Follow-up in 6 months with Dr. Olvera  3.  May increase Effexor XR 37.5 mg to 75 mg if needed for control of hot flashes after discussing with primary care.  4.  GI follow-up for her familial colon cancer history  5.  Repeat mammogram due December 2022  6.  Next DEXA scan due October 2023

## 2022-08-25 ENCOUNTER — LAB (OUTPATIENT)
Dept: LAB | Facility: HOSPITAL | Age: 57
End: 2022-08-25

## 2022-08-25 ENCOUNTER — OFFICE VISIT (OUTPATIENT)
Dept: ONCOLOGY | Facility: CLINIC | Age: 57
End: 2022-08-25

## 2022-08-25 VITALS
DIASTOLIC BLOOD PRESSURE: 80 MMHG | SYSTOLIC BLOOD PRESSURE: 123 MMHG | RESPIRATION RATE: 12 BRPM | OXYGEN SATURATION: 98 % | HEART RATE: 91 BPM | WEIGHT: 219 LBS | BODY MASS INDEX: 33.19 KG/M2 | TEMPERATURE: 97.1 F | HEIGHT: 68 IN

## 2022-08-25 DIAGNOSIS — D05.12 DUCTAL CARCINOMA IN SITU (DCIS) OF LEFT BREAST: ICD-10-CM

## 2022-08-25 DIAGNOSIS — D05.12 DUCTAL CARCINOMA IN SITU (DCIS) OF LEFT BREAST: Primary | ICD-10-CM

## 2022-08-25 DIAGNOSIS — Z12.31 ENCOUNTER FOR SCREENING MAMMOGRAM FOR BREAST CANCER: ICD-10-CM

## 2022-08-25 LAB
ALBUMIN SERPL-MCNC: 4.7 G/DL (ref 3.5–5.2)
ALBUMIN/GLOB SERPL: 1.7 G/DL (ref 1.1–2.4)
ALP SERPL-CCNC: 91 U/L (ref 38–116)
ALT SERPL W P-5'-P-CCNC: 25 U/L (ref 0–33)
ANION GAP SERPL CALCULATED.3IONS-SCNC: 13.9 MMOL/L (ref 5–15)
AST SERPL-CCNC: 22 U/L (ref 0–32)
BASOPHILS # BLD AUTO: 0.02 10*3/MM3 (ref 0–0.2)
BASOPHILS NFR BLD AUTO: 0.3 % (ref 0–1.5)
BILIRUB SERPL-MCNC: 0.4 MG/DL (ref 0.2–1.2)
BUN SERPL-MCNC: 23 MG/DL (ref 6–20)
BUN/CREAT SERPL: 21.3 (ref 7.3–30)
CALCIUM SPEC-SCNC: 9.7 MG/DL (ref 8.5–10.2)
CHLORIDE SERPL-SCNC: 105 MMOL/L (ref 98–107)
CO2 SERPL-SCNC: 23.1 MMOL/L (ref 22–29)
CREAT SERPL-MCNC: 1.08 MG/DL (ref 0.6–1.1)
DEPRECATED RDW RBC AUTO: 41.7 FL (ref 37–54)
EGFRCR SERPLBLD CKD-EPI 2021: 60.4 ML/MIN/1.73
EOSINOPHIL # BLD AUTO: 0.06 10*3/MM3 (ref 0–0.4)
EOSINOPHIL NFR BLD AUTO: 1 % (ref 0.3–6.2)
ERYTHROCYTE [DISTWIDTH] IN BLOOD BY AUTOMATED COUNT: 13.2 % (ref 12.3–15.4)
GLOBULIN UR ELPH-MCNC: 2.7 GM/DL (ref 1.8–3.5)
GLUCOSE SERPL-MCNC: 93 MG/DL (ref 74–124)
HCT VFR BLD AUTO: 45.2 % (ref 34–46.6)
HGB BLD-MCNC: 14.5 G/DL (ref 12–15.9)
IMM GRANULOCYTES # BLD AUTO: 0.02 10*3/MM3 (ref 0–0.05)
IMM GRANULOCYTES NFR BLD AUTO: 0.3 % (ref 0–0.5)
LYMPHOCYTES # BLD AUTO: 1.97 10*3/MM3 (ref 0.7–3.1)
LYMPHOCYTES NFR BLD AUTO: 34.1 % (ref 19.6–45.3)
MCH RBC QN AUTO: 27.9 PG (ref 26.6–33)
MCHC RBC AUTO-ENTMCNC: 32.1 G/DL (ref 31.5–35.7)
MCV RBC AUTO: 87.1 FL (ref 79–97)
MONOCYTES # BLD AUTO: 0.67 10*3/MM3 (ref 0.1–0.9)
MONOCYTES NFR BLD AUTO: 11.6 % (ref 5–12)
NEUTROPHILS NFR BLD AUTO: 3.04 10*3/MM3 (ref 1.7–7)
NEUTROPHILS NFR BLD AUTO: 52.7 % (ref 42.7–76)
NRBC BLD AUTO-RTO: 0 /100 WBC (ref 0–0.2)
PLATELET # BLD AUTO: 222 10*3/MM3 (ref 140–450)
PMV BLD AUTO: 9.8 FL (ref 6–12)
POTASSIUM SERPL-SCNC: 4.3 MMOL/L (ref 3.5–4.7)
PROT SERPL-MCNC: 7.4 G/DL (ref 6.3–8)
RBC # BLD AUTO: 5.19 10*6/MM3 (ref 3.77–5.28)
SODIUM SERPL-SCNC: 142 MMOL/L (ref 134–145)
WBC NRBC COR # BLD: 5.78 10*3/MM3 (ref 3.4–10.8)

## 2022-08-25 PROCEDURE — 85025 COMPLETE CBC W/AUTO DIFF WBC: CPT

## 2022-08-25 PROCEDURE — 99213 OFFICE O/P EST LOW 20 MIN: CPT | Performed by: INTERNAL MEDICINE

## 2022-08-25 PROCEDURE — 80053 COMPREHEN METABOLIC PANEL: CPT

## 2022-08-25 PROCEDURE — 36415 COLL VENOUS BLD VENIPUNCTURE: CPT

## 2022-08-25 RX ORDER — VENLAFAXINE HYDROCHLORIDE 75 MG/1
CAPSULE, EXTENDED RELEASE ORAL
COMMUNITY
Start: 2022-07-27

## 2022-08-25 RX ORDER — OMEPRAZOLE 40 MG/1
CAPSULE, DELAYED RELEASE ORAL
COMMUNITY
Start: 2022-08-23

## 2022-08-25 NOTE — PROGRESS NOTES
Subjective     REASON FOR CONSULTATION:  1. Left breast DCIS high-grade ER/WV negative post lumpectomy  2.  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    We discussed the fact that there is no good data on the use of prevention in the setting but we thought if she had bad bone density or some other indication we would give her Evista.  She was given a prescription for Evista though has not yet started this.      In addition her bone density was totally normal so I agree that there is no indication for the Evista currently and we will discontinue this    She is following with GI for her multiple colon polyps.    She continues on Wellbutrin which would be a contraindication for any tamoxifen    She is due for mammograms in December    Past Medical History:   Diagnosis Date   • Acid reflux disease    • Allergic rhinitis    • Anxiety and depression    • Arthritis    • Bladder spasm    • Breast cancer, left (HCC)    • History of 2019 novel coronavirus disease (COVID-19)     12/2020 ASYMPTOMATIC    • Low back pain    • Migraines    • Osteopenia    • Palpitation     ECHO DONE 2019 - NO FOLLOW UP REQUIRED   • Polyp of sigmoid colon    • PONV (postoperative nausea and vomiting)    • Post-menopausal    • Vitamin D deficiency         Past Surgical History:   Procedure Laterality Date   • BREAST BIOPSY     • BREAST LUMPECTOMY Left 2/9/2021    Procedure: Left needle-localized partial mastectomy;  Surgeon: Fernanda Barbour MD;  Location: [facility];  Service: General;  Laterality: Left;   • COLONOSCOPY  05/2015    Dr. Ramires   • CYSTOSCOPY      diagnostic, Dr. 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  This led to a diagnostic mammogram and biopsy On 2021 that showed DCIS high-grade 3 mm in greatest dimension with microcalcifications ER/MI negative  Patient was referred to Dr. Barbour and underwent bilateral breast MRIs Which showed a stable lesion in the right breast measuring 1.4 x 1 cm seen since  and a 1.2 x 3 cm biopsy cavity with no residual suspicious enhancement    Patient underwent lumpectomy On 2021 with the final specimen showing no residual DCIS or atypia and she is referred here for prevention    She is  3 para 2 with 1 miscarriage menarche was at age 14 she had a hysterectomy in  but is having hot flashes and is likely postmenopausal first childbirth was at age 35 she breast-fed her first child for a year second child for 6 weeks she has been on no hormones after menopause.    Family history is positive for mother with oral cancer related to smoking and father with bladder cancer both were over 75 she has a paternal grandmother with possible breast cancer at age 85 although no treatment was given patient had genetic testing which showed a V US in the  13 gene which is associated with prostate cancer    She is not a smoker or drinker  She is on Lexapro for depression  No history of DVT MI or stroke but she does have osteopenia and femoral necks bilaterally on her last DEXA scan in early     Strong family history of colon cancer.  Patient has multiple polyps -empiric breast cancer genetic testing shows a Hox 13 VUS -we will refer back to genetics to make sure all the GI malignancy panel has been covered     Moderate osteopenia both hips and this  will be rechecked in January and if there is progression favor using Evista for breast cancer prevention and osteopenia    Current Outpatient Medications on File Prior to Visit   Medication Sig Dispense Refill   • acyclovir (ZOVIRAX) 400 MG tablet Take 400 mg by mouth As Needed. Take no more than 5 doses a day.     • aspirin 81 MG tablet Take 81 mg by mouth Daily. HELD FOR OR     • buPROPion XL (WELLBUTRIN XL) 150 MG 24 hr tablet      • Cholecalciferol (VITAMIN D3) 2000 units tablet Take 2,000 Units by mouth Daily. HELD FOR OR     • cyclobenzaprine (FLEXERIL) 5 MG tablet Take 5 mg by mouth 3 (Three) Times a Day As Needed for Muscle Spasms.     • docusate sodium (COLACE) 250 MG capsule Take 100 mg by mouth Daily.     • ergocalciferol (ERGOCALCIFEROL) 1.25 MG (36898 UT) capsule Take 1 capsule by mouth Every 30 (Thirty) Days.     • esomeprazole (nexIUM) 40 MG capsule TAKE 1 CAPSULE BY MOUTH TWICE DAILY - DO NOT OPEN CAPSULE     • lisinopril (PRINIVIL,ZESTRIL) 10 MG tablet Take 10 mg by mouth Daily.     • meloxicam (MOBIC) 15 MG tablet Take 15 mg by mouth Daily. HELD FOR OR     • PRENATAL 28-0.8 MG tablet Take 1 tablet by mouth Daily. HELD FOR OR     • SUMAtriptan (IMITREX) 50 MG tablet Take 50 mg by mouth Every 2 (Two) Hours As Needed for Migraine. Take one tablet at onset of headache. May repeat dose one time in 2 hours if headache not relieved.     • topiramate (TOPAMAX) 50 MG tablet Take 50 mg by mouth Daily.     • uribel (URO-MP) 118 MG capsule capsule Take 1 capsule by mouth As Needed.     • venlafaxine XR (EFFEXOR-XR) 37.5 MG 24 hr capsule      • [DISCONTINUED] cimetidine (TAGAMET) 200 MG tablet Take 200 mg by mouth 2 (Two) Times a Day.     • [DISCONTINUED] COVID-19 Specimen Collection kit See Admin Instructions. for testing     • [DISCONTINUED] raloxifene (EVISTA) 60 MG tablet Take 1 tablet by mouth Daily. 30 tablet 11     No current facility-administered medications on file prior to visit.        ALLERGIES:     Allergies   Allergen Reactions   • Epinephrine Other (See Comments)     Shaking  Other reaction(s): Shakiness   • Vilazodone Hcl Hallucinations     hallucinations   • Morphine Itching   • Penicillins Rash   • Sulfa Antibiotics Rash   • Thimerosal Itching        Social History     Socioeconomic History   • Marital status:    • Number of children: 2   • Years of education: College   Tobacco Use   • Smoking status: Never Smoker   • Smokeless tobacco: Never Used   Vaping Use   • Vaping Use: Never used   Substance and Sexual Activity   • Alcohol use: Yes     Comment: one or two drinks a year   • Drug use: Never   • Sexual activity: Defer        Family History   Problem Relation Age of Onset   • Hypertension Mother    • Colon polyps Mother         sigmoid colon   • Thyroid cancer Mother    • Heart disease Mother    • Stroke Mother    • Benign prostatic hyperplasia Father    • Cancer Father         bladder   • Colon polyps Father         sigmoid colon   • Hypertension Father    • Hyperlipidemia Father    • Colon polyps Brother         sigmoid colon   • Hypertension Brother    • Colon cancer Maternal Grandmother    • Colon cancer Maternal Grandfather    • Heart disease Maternal Grandfather    • Heart attack Maternal Grandfather    • Colon cancer Paternal Uncle    • Heart disease Maternal Uncle    • Stroke Paternal Grandmother    • Breast cancer Paternal Grandmother 88   • Diabetes Paternal Grandfather    • Heart attack Paternal Grandfather    • Heart disease Paternal Grandfather    • Hypertension Brother    • Colon cancer Paternal Aunt    • Malig Hyperthermia Neg Hx         Review of Systems   Constitutional: Positive for diaphoresis.   HENT: Positive for hearing loss.    Eyes: Positive for visual disturbance (Double vision after trauma to her eye).   Endocrine: Positive for heat intolerance.   Musculoskeletal: Positive for gait problem (Recurrent falls due to bad balance).   Hematological: Bruises/bleeds easily.  "       Objective     Vitals:    08/25/22 1457   Height: 173 cm (68.11\")     Current Status 3/8/2022   ECOG score 0       Physical Exam   CONSTITUTIONAL:  Vital signs reviewed.  No distress, looks comfortable.  EYES:  Conjunctiva and lids unremarkable.  PERRLA  EARS,NOSE,MOUTH,THROAT: Hearing intact, mask in place  RESPIRATORY:  Normal respiratory effort.    BREASTS: Benign with no dominant masses  SKIN:  Warm.  No rashes.  NEURO: AAO x3  PSYCHIATRIC:  Normal judgment and insight.  Normal mood and affect.        RECENT LABS:  Hematology WBC   Date Value Ref Range Status   08/25/2022 5.78 3.40 - 10.80 10*3/mm3 Final     RBC   Date Value Ref Range Status   08/25/2022 5.19 3.77 - 5.28 10*6/mm3 Final     Hemoglobin   Date Value Ref Range Status   08/25/2022 14.5 12.0 - 15.9 g/dL Final     Hematocrit   Date Value Ref Range Status   08/25/2022 45.2 34.0 - 46.6 % Final     Platelets   Date Value Ref Range Status   08/25/2022 222 140 - 450 10*3/mm3 Final          Assessment & Plan   1.pTis Nx high-grade ER/OH negative post lumpectomy and radiation.  Discussed most recent DEXA scan with Dr. Olvera today as we previously had considered Evista in the setting of her osteopenia and for chemoprevention.  We discussed the possibility of tamoxifen today however the patient has been on Wellbutrin for long-term to help with her migraine she reports which are now well controlled.  Therefore, we will proceed with Evista for chemoprevention in this setting.    Bone density normal will hold off on any prevention at this time    2.  Arnold-Chiari malformation    3.  Depression.  Patient continues on Wellbutrin.  Effexor was added for hot flash management though she feels her mood is currently well controlled as well.  Hot flashes are breaking through at times and she will log of this to see if she feels she needs to increase her Effexor Exar up to 75 mg daily.    4. Migraines.  Currently controlled on Wellbutrin and Topamax with Imitrex " for breakthrough.    5.  Strong family history of colon cancer.  Patient has multiple polyps -empiric breast cancer genetic testing shows a Hox 13 VUS -we will refer back to genetics to make sure all the GI malignancy panel has been covered  · All the genetic testing was negative except for the VUS  · GI following for colon cancer risk    6.  Moderate osteopenia both hips-repeat DEXA scan 10/29/2021 showing normal bone density.    7.  Vitamin D deficiency.  Patient is taking 50,000 international units vitamin D once monthly and had previously also been taking 2000 IU daily of vitamin D.  She has stopped the daily vitamin D over the past 2 to 3 months.  Vitamin D level checked today and is sufficient at 47.8.  Will send copy to PCP for their management.      Plan  1.   Follow-up in 12 months with Dr. Olvera  2.  GI follow-up for her familial colon cancer history  3.  Repeat mammogram due December 2022  4.  Next DEXA scan due October 2023

## 2022-09-26 ENCOUNTER — OFFICE VISIT (OUTPATIENT)
Dept: SURGERY | Facility: CLINIC | Age: 57
End: 2022-09-26

## 2022-09-26 ENCOUNTER — TELEPHONE (OUTPATIENT)
Dept: SURGERY | Facility: CLINIC | Age: 57
End: 2022-09-26

## 2022-09-26 VITALS
BODY MASS INDEX: 33.04 KG/M2 | WEIGHT: 218 LBS | SYSTOLIC BLOOD PRESSURE: 110 MMHG | DIASTOLIC BLOOD PRESSURE: 78 MMHG | HEIGHT: 68 IN

## 2022-09-26 DIAGNOSIS — D05.12 DUCTAL CARCINOMA IN SITU (DCIS) OF LEFT BREAST: Primary | ICD-10-CM

## 2022-09-26 PROCEDURE — 99213 OFFICE O/P EST LOW 20 MIN: CPT | Performed by: NURSE PRACTITIONER

## 2022-09-26 NOTE — PROGRESS NOTES
[facility]     Referring Provider: Dr. Nikolay Hernandez     Chief complaint: breast cancer follow up     HPI:   1/18/21:  Seen by Dr Barbour  Ms. Vivienne Barbour is a 54 yo woman, seen at the request of Dr. 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 This was initially detected as an imaging abnormality on routine screening. Her work-up is detailed in the oncologic history below. Prior to the biopsy, she denies any breast lumps, pain, skin changes, or nipple discharge. She denies any prior history of abnormal mammograms or breast biopsies. She has a family history of breast cancer in her paternal grandmother (diagnosed at age 88). She denies any family history of ovarian cancer.      2/24/21:  Seen by Dr Barbour  She underwent left partial mastectomy on 2/9/21. See surgery & pathology details below in oncologic history. She has been recovering well and has no complaints.     7/14/21:  She returns today for follow up with some left breast heaviness and mild tenderness she has noticed since RT.    Last saw Dr Olvera in 3/21, contemplating taking evista dependent on bone density results.  She will discuss further when she returns to see her in 9/2021.  Completed RT on 4/1/2021.  In 4/21 she was seen in Survivorship clinic.    9/26/22, Interval History:  She returns today for follow up with continued left breast tenderness that is not limiting.      Her last clinic visit with Dr Olvera was in 8/22:  She was given a prescription for Evista though has not yet started this.    In addition her bone density was totally normal so I agree that there is no indication for the Evista currently and we will discontinue this    Screening mammogram with tomosynthesis was completed last on 12/21/21 with BiRads 2 results.  (see full report below)       Oncology/Hematology History Overview Note   11/07/18, Screening MMG with [facility] ([facility]):  Scattered areas of fibroglandular density. There is a  stable oval mass measuring 15 millimeters seen in the posterior one-third region of the right breast at 10 o'clock. No suspicious masses, suspicious microcalcifications or new areas of architectural distortion are identified. There has been no significant change from the prior exam(s). In the left breast, no suspicious masses, significant calcifications or other abnormalities are seen.  BI-RADS 2: Benign.    11/15/19, Screening MMG with [facility] ([facility]):  Scattered areas of fibroglandular density. There is a focal asymmetry seen in the posterior one-third upper outer region of the left breast. In the right breast, no suspicious masses, significant calcifications or other abnormalities are seen.  BI-RADS 0: Incomplete.    12/17/19, Left Diagnostic MMG with [facility] & Left Breast US ([facility]):  MMG:  On the present examination, focal asymmetry in the left breast, upper outer does not persist. With all diagnostic views, this area is not seen. No suspicious mammographic finding is seen.  US:  No suspicious sonographic finding is seen. A small incidental simple cyst with a thin smooth internal septations is present at the 1:30 location, 2 cm from the nipple. This is not the mammographic area which was evaluated and is a small incidental benign simple cyst.  BI-RADS 1: Negative.     Ductal carcinoma in situ (DCIS) of left breast   12/10/2020 Initial Diagnosis    Ductal carcinoma in situ (DCIS) of left breast     12/11/2020 Imaging    Screening MMG with [facility] ([facility]):  Scattered areas of fibroglandular density.  1. There are a few new punctate calcifications with grouped distribution seen in the middle one-third 1:30 o'clock region of the left breast.  2. There is a stable oval mass measuring 15 mm seen in the posterior one-third 10:30 o'clock region of the right breast. No suspicious masses, suspicious microcalcifications or new areas of architectural distortion are identified. There has been no significant change from the  prior exam(s).  BI-RADS 0: Incomplete.     1/4/2021 Imaging    Left Diagnostic MMG with [facility] ([facility]):  On the present examination, there are new amorphous and indistinct calcifications measuring 4 mm with grouped distribution in the middle one-third 1:30 o'clock region of the left breast located 10 centimeters from the nipple. Additional imaging demonstrates microcalcifications are suspicious and tissue sampling is recommended.  Bi-RADS 4B: Suspicious.     1/11/2021 Biopsy    Left Breast, Stereotactic Biopsy ([facility]):    Breast, Left 1:30 O'clock, Core Needle Biopsy:  Ductal Carcinoma in Situ (DCIS), High Nuclear Grade, Comedo Pattern, 3 mm in Greatest Dimension, Present in 3 of 13 Cores.   Microcalcifications Associated with DCIS and Benign Mammary Ducts/Lobules.    ER negative (0.29%)  MI negative (0%)  Ki-67 32.68%    -Tophat clip. No residual calcifications were visible on post procedure mammogram.     1/15/2021 Imaging    [facility]  RIGHT BREAST:    At 10:00 in the posterior right breast, approximately 9 cm posterior to the nipple, there is a 1.4 cm AP dimension, 0.9 cm transverse dimension, 0.9 cm craniocaudal dimension oval enhancing mass, which is mammographically stable dating back to at least 2016. No suspicious enhancing mass or area of non-mass enhancement is identified. The visualized axilla is within normal limits.    LEFT BREAST:    At 1:00, approximately 6 cm posterior to the nipple, there is an approximately 1.2 x 0.7 x 3.1 cm biopsy cavity/tract with a central focus of susceptibility from a biopsy clip, which marks the site of  biopsy-proven malignancy. There is subtle enhancement extending from the biopsy site to the overlying skin, which is likely postbiopsy in nature. No suspicious mass or mass enhancement is identified at this location or elsewhere within the left breast. No suspicious enhancement is identified in the left nipple or chest wall. The visualized axilla is  within normal limits.   EXTRAMAMMARY FINDINGS:   There are no abnormally enlarged internal mammary chain lymph nodes on either side.  BI-RADS 6: Known malignancy.     1/19/2021 Genetic Testing    [employer] Common Hereditary Cancers Panel (47 genes):    VUS in HOXB13     2/9/2021 Surgery    Left needle-localized partial mastectomy    1. Left Breast, Oriented Needle Localization Partial Mastectomy (29 grams):               A. Benign breast tissue with sclerosing adenosis, fibrocystic change and usual ductal hyperplasia with associated       calcifications (see Comment).  B. Biopsy site identified and clip retrieved.  C. No atypical hyperplasia, in situ nor invasive carcinoma identified (margins clear).     2. Left Breast, Additional Superior, Medial and Posterior Margins, Oriented Excision:               A. Benign fatty breast tissue with focal columnar cell hyperplasia.                B. No atypical hyperplasia, in situ nor invasive carcinoma identified (margins clear).     3/1/2021 Cancer Staged    Staging form: Breast, AJCC 8th Edition  - Pathologic: Stage Unknown (pTis (DCIS), pNX, G2, ER-, NJ-) - Signed by Juan Olvera MD on 3/1/2021     3/11/2021 - 4/1/2021 Radiation    Radiation OncologyTreatment Course:  Vivienne Barbour received 4256 cGy in 16 fractions to LEFT breast.      Hormonal Therapy    Evista planned after review of bone density     12/21/2021 Imaging    [facility]  There are scattered areas of fibroglandular density.  There is a new postsurgical scar with associated skin thickening seen in the 130 o'clock region of the left breast.  Postsurgical scars in area of prior lumpectomy.  Metallic surgical clips noted.  Findings are consistent with postsurgical and postradiation therapy changes.  In the right breast, no suspicious masses, significant calcifications or other abnormalities are seen.  Impression:  New postsurgical scar left breast is  benign negative.  At this time there is nothing to suggest malignancy.  Screening mammogram 1 year is recommended.  BI-RADS Category 2         Review of Systems:  See interval history.       Medications:    Current Outpatient Medications:   •  acyclovir (ZOVIRAX) 400 MG tablet, Take 400 mg by mouth As Needed. Take no more than 5 doses a day., Disp: , Rfl:   •  aspirin 81 MG tablet, Take 81 mg by mouth Daily. HELD FOR OR, Disp: , Rfl:   •  buPROPion XL (WELLBUTRIN XL) 150 MG 24 hr tablet, , Disp: , Rfl:   •  Cholecalciferol 50 MCG (2000 UT) capsule, Take 50 mcg by mouth. 3 Times a week, Disp: , Rfl:   •  cyclobenzaprine (FLEXERIL) 5 MG tablet, Take 5 mg by mouth 3 (Three) Times a Day As Needed for Muscle Spasms., Disp: , Rfl:   •  docusate sodium (COLACE) 250 MG capsule, Take 100 mg by mouth Daily., Disp: , Rfl:   •  ergocalciferol (ERGOCALCIFEROL) 1.25 MG (50088 UT) capsule, Take 1 capsule by mouth Every 30 (Thirty) Days., Disp: , Rfl:   •  lisinopril (PRINIVIL,ZESTRIL) 10 MG tablet, Take 10 mg by mouth Daily., Disp: , Rfl:   •  meloxicam (MOBIC) 15 MG tablet, Take 15 mg by mouth Daily. HELD FOR OR, Disp: , Rfl:   •  omeprazole (priLOSEC) 40 MG capsule, , Disp: , Rfl:   •  PRENATAL 28-0.8 MG tablet, Take 1 tablet by mouth Daily. HELD FOR OR, Disp: , Rfl:   •  SUMAtriptan (IMITREX) 50 MG tablet, Take 50 mg by mouth Every 2 (Two) Hours As Needed for Migraine. Take one tablet at onset of headache. May repeat dose one time in 2 hours if headache not relieved., Disp: , Rfl:   •  topiramate (TOPAMAX) 50 MG tablet, Take 50 mg by mouth Daily., Disp: , Rfl:   •  uribel (URO-MP) 118 MG capsule capsule, Take 1 capsule by mouth As Needed., Disp: , Rfl:   •  venlafaxine XR (EFFEXOR-XR) 75 MG 24 hr capsule, , Disp: , Rfl:       Allergies   Allergen Reactions   • Epinephrine Other (See Comments)     Shaking  Other reaction(s): Shakiness   • Vilazodone Hcl Hallucinations     hallucinations   • Morphine Itching   • Penicillins Rash  "  • Sulfa Antibiotics Rash   • Thimerosal Itching       Family History   Problem Relation Age of Onset   • Hypertension Mother    • Colon polyps Mother         sigmoid colon   • Thyroid cancer Mother    • Heart disease Mother    • Stroke Mother    • Benign prostatic hyperplasia Father    • Cancer Father         bladder   • Colon polyps Father         sigmoid colon   • Hypertension Father    • Hyperlipidemia Father    • Colon polyps Brother         sigmoid colon   • Hypertension Brother    • Colon cancer Maternal Grandmother    • Colon cancer Maternal Grandfather    • Heart disease Maternal Grandfather    • Heart attack Maternal Grandfather    • Colon cancer Paternal Uncle    • Heart disease Maternal Uncle    • Stroke Paternal Grandmother    • Breast cancer Paternal Grandmother 88   • Diabetes Paternal Grandfather    • Heart attack Paternal Grandfather    • Heart disease Paternal Grandfather    • Hypertension Brother    • Colon cancer Paternal Aunt    • Malig Hyperthermia Neg Hx        PHYSICAL EXAMINATION:   Vitals:    09/26/22 1145   BP: 110/78      /78 (BP Location: Left arm)   Ht 172.7 cm (68\")   Wt 98.9 kg (218 lb)   BMI 33.15 kg/m²     I reviewed physical exam, no changes noted    ECOG 0 - Asymptomatic  General: NAD, well appearing  Psych: a&o x 3, normal mood and affect  Eyes: EOMI, no scleral icterus  ENMT: neck supple without masses or thyromegaly, mucus membranes moist  MSK: normal gait, normal ROM in bilateral shoulders  Lymph nodes: no cervical, supraclavicular or axillary lymphadenopathy  Breast: symmetric, large size, grade 3 ptosis  Right: No visible abnormalities on inspection while seated, with arms raised or hands on hips. No masses, skin changes, or nipple abnormalities.  Left: Well-healed upper outer curvilinear incision. No concavity or fullness at the surgical site, mild skin changes r/t RT.  No visible abnormalities on inspection while seated, with arms raised or hands on hips. No " masses, skin changes, or nipple abnormalities.        Assessment:   1)  57 y.o. 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 She is sp left partial mastectomy on 2/9/21, with no residual disease found in the surgical specimen, 3 mm on core, pTis.    Discussion:  We discussed her breast changes that she has noticed since radiation.  I advised her that these are c/w post radiation changes and should improve to some extent.  There may be some residual tenderness that persists.  I advised her to wear a sports bra at all times, a softer supportive bra at bedtime as well.  She is in agreement with this plan.    Plan:  -continue follow up with Dr. Olvera   -screening with tomosynthesis in 12/22 at [facility], I will call her with results and schedule follow up for 12/2023 if stable findings.    -She was instructed to call sooner with any questions, concerns or changes on BSE.    MICHELLE Kent      CC:  DO Rita Vizcaino MD

## 2022-09-26 NOTE — TELEPHONE ENCOUNTER
----- Message from Luis F Rice MA sent at 9/22/2022  2:58 PM EDT -----  Patient called to get appointment from 1/26/22 rescheduled.   Last recent imaging is scanned in chart from 12/21/21.     Next scheduled imaging 12/26/22, [facility].    She is on @ 2:00 pm Monday 9/26/222.    Thank you

## 2022-12-27 ENCOUNTER — APPOINTMENT (OUTPATIENT)
Dept: WOMENS IMAGING | Facility: HOSPITAL | Age: 57
End: 2022-12-27
Payer: COMMERCIAL

## 2022-12-27 PROCEDURE — 77063 BREAST TOMOSYNTHESIS BI: CPT | Performed by: RADIOLOGY

## 2022-12-27 PROCEDURE — 77067 SCR MAMMO BI INCL CAD: CPT | Performed by: RADIOLOGY

## 2023-01-03 ENCOUNTER — TELEPHONE (OUTPATIENT)
Dept: SURGERY | Facility: CLINIC | Age: 58
End: 2023-01-03
Payer: COMMERCIAL

## 2023-01-03 DIAGNOSIS — D05.12 DUCTAL CARCINOMA IN SITU (DCIS) OF LEFT BREAST: Primary | ICD-10-CM

## 2023-01-03 NOTE — TELEPHONE ENCOUNTER
Screening mammogram from 12/27/22 reported to patient via .  Results stable with one year follow up.  We will contact her to schedule follow up screening mammogram in one year followed by exam.    Impression:  Finding 1: Postsurgical scar left breast is benign negative.  At this time there is nothing to suggest malignancy.  Finding 2: Mass right breast is benign negative.  Screening mammogram 1 year is recommended.  BI-RADS Category 2

## 2023-01-05 ENCOUNTER — TELEPHONE (OUTPATIENT)
Dept: SURGERY | Facility: CLINIC | Age: 58
End: 2023-01-05
Payer: COMMERCIAL

## 2023-01-05 NOTE — TELEPHONE ENCOUNTER
LM for call back regarding scheduling next screening MGM at [facility]. Need to know what days and times work best for her. Also, need to set up next FU with Christina Ramos.

## 2023-02-21 ENCOUNTER — TELEPHONE (OUTPATIENT)
Dept: SURGERY | Facility: CLINIC | Age: 58
End: 2023-02-21
Payer: COMMERCIAL

## 2023-05-11 ENCOUNTER — TELEPHONE (OUTPATIENT)
Dept: INTERNAL MEDICINE | Facility: CLINIC | Age: 58
End: 2023-05-11
Payer: COMMERCIAL

## 2023-08-17 ENCOUNTER — TELEPHONE (OUTPATIENT)
Dept: ONCOLOGY | Facility: CLINIC | Age: 58
End: 2023-08-17
Payer: COMMERCIAL

## 2023-08-17 NOTE — TELEPHONE ENCOUNTER
HUB TO READ:  Left msg for pt to update new ins.  Please staff msg me when complete.  Thank you, Mary with [facility]

## 2023-08-30 ENCOUNTER — OFFICE VISIT (OUTPATIENT)
Dept: ONCOLOGY | Facility: CLINIC | Age: 58
End: 2023-08-30
Payer: COMMERCIAL

## 2023-08-30 ENCOUNTER — LAB (OUTPATIENT)
Dept: LAB | Facility: HOSPITAL | Age: 58
End: 2023-08-30
Payer: COMMERCIAL

## 2023-08-30 VITALS
RESPIRATION RATE: 16 BRPM | DIASTOLIC BLOOD PRESSURE: 78 MMHG | SYSTOLIC BLOOD PRESSURE: 117 MMHG | WEIGHT: 229.3 LBS | HEIGHT: 68 IN | BODY MASS INDEX: 34.75 KG/M2 | OXYGEN SATURATION: 98 % | HEART RATE: 78 BPM | TEMPERATURE: 98 F

## 2023-08-30 DIAGNOSIS — D05.12 DUCTAL CARCINOMA IN SITU (DCIS) OF LEFT BREAST: Primary | ICD-10-CM

## 2023-08-30 DIAGNOSIS — D05.12 DUCTAL CARCINOMA IN SITU (DCIS) OF LEFT BREAST: ICD-10-CM

## 2023-08-30 LAB
ALBUMIN SERPL-MCNC: 4.3 G/DL (ref 3.5–5.2)
ALBUMIN/GLOB SERPL: 1.7 G/DL
ALP SERPL-CCNC: 101 U/L (ref 39–117)
ALT SERPL W P-5'-P-CCNC: 17 U/L (ref 1–33)
ANION GAP SERPL CALCULATED.3IONS-SCNC: 10 MMOL/L (ref 5–15)
AST SERPL-CCNC: 20 U/L (ref 1–32)
BASOPHILS # BLD AUTO: 0.03 10*3/MM3 (ref 0–0.2)
BASOPHILS NFR BLD AUTO: 0.6 % (ref 0–1.5)
BILIRUB SERPL-MCNC: 0.4 MG/DL (ref 0–1.2)
BUN SERPL-MCNC: 23 MG/DL (ref 6–20)
BUN/CREAT SERPL: 23.5 (ref 7–25)
CALCIUM SPEC-SCNC: 9.2 MG/DL (ref 8.6–10.5)
CHLORIDE SERPL-SCNC: 109 MMOL/L (ref 98–107)
CO2 SERPL-SCNC: 25 MMOL/L (ref 22–29)
CREAT SERPL-MCNC: 0.98 MG/DL (ref 0.6–1.1)
DEPRECATED RDW RBC AUTO: 44.8 FL (ref 37–54)
EGFRCR SERPLBLD CKD-EPI 2021: 67.5 ML/MIN/1.73
EOSINOPHIL # BLD AUTO: 0.09 10*3/MM3 (ref 0–0.4)
EOSINOPHIL NFR BLD AUTO: 1.8 % (ref 0.3–6.2)
ERYTHROCYTE [DISTWIDTH] IN BLOOD BY AUTOMATED COUNT: 13.2 % (ref 12.3–15.4)
GLOBULIN UR ELPH-MCNC: 2.6 GM/DL
GLUCOSE SERPL-MCNC: 86 MG/DL (ref 65–99)
HCT VFR BLD AUTO: 45.7 % (ref 34–46.6)
HGB BLD-MCNC: 14.4 G/DL (ref 12–15.9)
IMM GRANULOCYTES # BLD AUTO: 0.02 10*3/MM3 (ref 0–0.05)
IMM GRANULOCYTES NFR BLD AUTO: 0.4 % (ref 0–0.5)
LYMPHOCYTES # BLD AUTO: 1.3 10*3/MM3 (ref 0.7–3.1)
LYMPHOCYTES NFR BLD AUTO: 26.6 % (ref 19.6–45.3)
MCH RBC QN AUTO: 29.2 PG (ref 26.6–33)
MCHC RBC AUTO-ENTMCNC: 31.5 G/DL (ref 31.5–35.7)
MCV RBC AUTO: 92.7 FL (ref 79–97)
MONOCYTES # BLD AUTO: 0.51 10*3/MM3 (ref 0.1–0.9)
MONOCYTES NFR BLD AUTO: 10.5 % (ref 5–12)
NEUTROPHILS NFR BLD AUTO: 2.93 10*3/MM3 (ref 1.7–7)
NEUTROPHILS NFR BLD AUTO: 60.1 % (ref 42.7–76)
NRBC BLD AUTO-RTO: 0 /100 WBC (ref 0–0.2)
PLATELET # BLD AUTO: 231 10*3/MM3 (ref 140–450)
PMV BLD AUTO: 9.2 FL (ref 6–12)
POTASSIUM SERPL-SCNC: 4 MMOL/L (ref 3.5–5.2)
PROT SERPL-MCNC: 6.9 G/DL (ref 6–8.5)
RBC # BLD AUTO: 4.93 10*6/MM3 (ref 3.77–5.28)
SODIUM SERPL-SCNC: 144 MMOL/L (ref 136–145)
WBC NRBC COR # BLD: 4.88 10*3/MM3 (ref 3.4–10.8)

## 2023-08-30 PROCEDURE — 85025 COMPLETE CBC W/AUTO DIFF WBC: CPT

## 2023-08-30 PROCEDURE — 36415 COLL VENOUS BLD VENIPUNCTURE: CPT

## 2023-08-30 PROCEDURE — 80053 COMPREHEN METABOLIC PANEL: CPT

## 2023-08-30 NOTE — PROGRESS NOTES
Subjective     REASON FOR CONSULTATION:  1. Left breast DCIS high-grade ER/DE negative post lumpectomy  2.  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    We discussed the fact that there is no good data on the use of prevention in the setting but we thought if she had bad bone density or some other indication we would give her Evista.  Bone density is normal and decision made not to use any prevention which is standard of care    She is following with GI for her multiple colon polyps.    She continues on Wellbutrin which would be a contraindication for any tamoxifen    She is due for mammograms in December 2023    Past Medical History:   Diagnosis Date    Acid reflux disease     Allergic rhinitis     Anxiety and depression     Arthritis     Bladder spasm     Breast cancer, left     History of 2019 novel coronavirus disease (COVID-19)     12/2020 ASYMPTOMATIC     Low back pain     Migraines     Osteopenia     Palpitation     ECHO DONE 2019 - NO FOLLOW UP REQUIRED    Polyp of sigmoid colon     PONV (postoperative nausea and vomiting)     Post-menopausal     Vitamin D deficiency         Past Surgical History:   Procedure Laterality Date    BREAST BIOPSY      BREAST LUMPECTOMY Left 2/9/2021    Procedure: Left needle-localized partial mastectomy;  Surgeon: Fernanda Barbour MD;  Location: [facility];  Service: General;  Laterality: Left;    COLONOSCOPY  05/2015    Dr. Ramires    CYSTOSCOPY      diagnostic, Dr. 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  This led to a diagnostic mammogram and biopsy On 2021 that showed DCIS high-grade 3 mm in greatest dimension with microcalcifications ER/ND negative  Patient was referred to Dr. Barbour and underwent bilateral breast MRIs Which showed a stable lesion in the right breast measuring 1.4 x 1 cm seen since  and a 1.2 x 3 cm biopsy cavity with no residual suspicious enhancement    Patient underwent lumpectomy On 2021 with the final specimen showing no residual DCIS or atypia and she is referred here for prevention    She is  3 para 2 with 1 miscarriage menarche was at age 14 she had a hysterectomy in  but is having hot flashes and is likely postmenopausal first childbirth was at age 35 she breast-fed her first child for a year second child for 6 weeks she has been on no hormones after menopause.    Family history is positive for mother with oral cancer related to smoking and father with bladder cancer both were over 75 she has a paternal grandmother with possible breast cancer at age 85 although no treatment was given patient had genetic testing which showed a V US in the  13 gene which is associated with prostate cancer    She is not a smoker or drinker  She is on Lexapro for depression  No history of DVT MI or stroke but she does have osteopenia and femoral necks bilaterally on her last DEXA scan in early     Strong family history of colon cancer.  Patient has multiple polyps -empiric breast cancer genetic testing shows a Hox 13 VUS -we will refer back to genetics to make sure all the GI malignancy panel has been covered     Moderate osteopenia both hips and this will be rechecked in January and if there is progression favor using Evista for breast cancer prevention and osteopenia    Current Outpatient Medications on File  Prior to Visit   Medication Sig Dispense Refill    acyclovir (ZOVIRAX) 400 MG tablet Take 1 tablet by mouth As Needed. Take no more than 5 doses a day.      aspirin 81 MG tablet Take 1 tablet by mouth Daily. HELD FOR OR      buPROPion XL (WELLBUTRIN XL) 150 MG 24 hr tablet       Cholecalciferol 50 MCG (2000 UT) capsule Take 50 mcg by mouth. 3 Times a week      cyclobenzaprine (FLEXERIL) 5 MG tablet Take 1 tablet by mouth 3 (Three) Times a Day As Needed for Muscle Spasms.      docusate sodium (COLACE) 250 MG capsule Take 100 mg by mouth Daily.      ergocalciferol (ERGOCALCIFEROL) 1.25 MG (06227 UT) capsule Take 1 capsule by mouth Every 30 (Thirty) Days.      lisinopril (PRINIVIL,ZESTRIL) 10 MG tablet Take 1 tablet by mouth Daily.      meloxicam (MOBIC) 15 MG tablet Take 1 tablet by mouth Daily. HELD FOR OR      PRENATAL 28-0.8 MG tablet Take 1 tablet by mouth Daily. HELD FOR OR      SUMAtriptan (IMITREX) 50 MG tablet Take 1 tablet by mouth Every 2 (Two) Hours As Needed for Migraine. Take one tablet at onset of headache. May repeat dose one time in 2 hours if headache not relieved.      topiramate (TOPAMAX) 50 MG tablet Take 1 tablet by mouth Daily.      venlafaxine XR (EFFEXOR-XR) 75 MG 24 hr capsule       [DISCONTINUED] omeprazole (priLOSEC) 40 MG capsule       [DISCONTINUED] uribel (URO-MP) 118 MG capsule capsule Take 1 capsule by mouth As Needed.       No current facility-administered medications on file prior to visit.        ALLERGIES:    Allergies   Allergen Reactions    Epinephrine Other (See Comments)     Shaking  Other reaction(s): Shakiness    Vilazodone Hcl Hallucinations     hallucinations    Morphine Itching    Penicillins Rash    Sulfa Antibiotics Rash    Thimerosal Itching        Social History     Socioeconomic History    Marital status:     Number of children: 2    Years of education: College   Tobacco Use    Smoking status: Never    Smokeless tobacco: Never   Vaping Use    Vaping Use: Never  "used   Substance and Sexual Activity    Alcohol use: Yes     Comment: one or two drinks a year    Drug use: Never    Sexual activity: Defer        Family History   Problem Relation Age of Onset    Hypertension Mother     Colon polyps Mother         sigmoid colon    Thyroid cancer Mother     Heart disease Mother     Stroke Mother     Benign prostatic hyperplasia Father     Cancer Father         bladder    Colon polyps Father         sigmoid colon    Hypertension Father     Hyperlipidemia Father     Colon polyps Brother         sigmoid colon    Hypertension Brother     Colon cancer Maternal Grandmother     Colon cancer Maternal Grandfather     Heart disease Maternal Grandfather     Heart attack Maternal Grandfather     Colon cancer Paternal Uncle     Heart disease Maternal Uncle     Stroke Paternal Grandmother     Breast cancer Paternal Grandmother 88    Diabetes Paternal Grandfather     Heart attack Paternal Grandfather     Heart disease Paternal Grandfather     Hypertension Brother     Colon cancer Paternal Aunt     Malig Hyperthermia Neg Hx         Review of Systems   Constitutional:  Positive for diaphoresis.   HENT:  Positive for hearing loss.    Eyes:  Positive for visual disturbance (Double vision after trauma to her eye).   Endocrine: Positive for heat intolerance.   Musculoskeletal:  Positive for gait problem (Recurrent falls due to bad balance).   Hematological:  Bruises/bleeds easily.      Objective     Vitals:    08/30/23 1140   Temp: 98 øF (36.7 øC)   TempSrc: Temporal   Weight: 104 kg (229 lb 4.8 oz)   Height: 172.7 cm (67.99\")   PainSc: 0-No pain         8/30/2023    11:11 AM   Current Status   ECOG score 0       Physical Exam   CONSTITUTIONAL:  Vital signs reviewed.  No distress, looks comfortable.  EYES:  Conjunctiva and lids unremarkable.  PERRLA  EARS,NOSE,MOUTH,THROAT: Hearing intact,   RESPIRATORY:  Normal respiratory effort.    BREASTS: Benign with no dominant masses  SKIN:  Warm.  No " lisbeth.  NEURO: AAO x3  PSYCHIATRIC:  Normal judgment and insight.  Normal mood and affect.        RECENT LABS:  Hematology WBC   Date Value Ref Range Status   08/30/2023 4.88 3.40 - 10.80 10*3/mm3 Final     RBC   Date Value Ref Range Status   08/30/2023 4.93 3.77 - 5.28 10*6/mm3 Final     Hemoglobin   Date Value Ref Range Status   08/30/2023 14.4 12.0 - 15.9 g/dL Final     Hematocrit   Date Value Ref Range Status   08/30/2023 45.7 34.0 - 46.6 % Final     Platelets   Date Value Ref Range Status   08/30/2023 231 140 - 450 10*3/mm3 Final          Assessment & Plan   1.pTis Nx high-grade ER/DE negative post lumpectomy and radiation.  Discussed most recent DEXA scan with Dr. Olvera today as we previously had considered Evista in the setting of her osteopenia and for chemoprevention.      Bone density normal will hold off on any prevention at this time    2.  Arnold-Chiari malformation    3.  Depression.  Patient continues on Wellbutrin.  Effexor was added for hot flash management though she feels her mood is currently well controlled as well.  Hot flashes are breaking through at times and she will log of this to see if she feels she needs to increase her Effexor Exar up to 75 mg daily.    4. Migraines.  Currently controlled on Wellbutrin and Topamax with Imitrex for breakthrough.    5.  Strong family history of colon cancer.  Patient has multiple polyps -empiric breast cancer genetic testing shows a Hox 13 VUS -we will refer back to genetics to make sure all the GI malignancy panel has been covered  All the genetic testing was negative except for the VUS  GI following for colon cancer risk    6.  Moderate osteopenia both hips-repeat DEXA scan 10/29/2021 showing normal bone density.    7.  Vitamin D deficiency.  Patient is taking 50,000 international units vitamin D once monthly and had previously also been taking 2000 IU daily of vitamin D.  She has stopped the daily vitamin D over the past 2 to 3 months.  Vitamin D  level checked today and is sufficient at 47.8.  Will send copy to PCP for their management.      Plan  1.   Follow-up with surgeon only at this time   2.  Repeat mammogram due December 2023  3.  Next DEXA scan due October 2023    We will see her as needed

## 2024-01-16 ENCOUNTER — TELEPHONE (OUTPATIENT)
Dept: SURGERY | Facility: CLINIC | Age: 59
End: 2024-01-16
Payer: COMMERCIAL

## 2024-01-16 ENCOUNTER — OFFICE VISIT (OUTPATIENT)
Dept: SURGERY | Facility: CLINIC | Age: 59
End: 2024-01-16
Payer: COMMERCIAL

## 2024-01-16 VITALS
DIASTOLIC BLOOD PRESSURE: 88 MMHG | HEIGHT: 68 IN | SYSTOLIC BLOOD PRESSURE: 138 MMHG | BODY MASS INDEX: 34.71 KG/M2 | WEIGHT: 229 LBS

## 2024-01-16 DIAGNOSIS — D05.12 DUCTAL CARCINOMA IN SITU (DCIS) OF LEFT BREAST: Primary | ICD-10-CM

## 2024-01-16 PROCEDURE — 99213 OFFICE O/P EST LOW 20 MIN: CPT | Performed by: NURSE PRACTITIONER

## 2024-01-16 NOTE — LETTER
January 16, 2024     Tala Ypi DO  [address] [place] [id number]    Patient: Vivienne Barbour   YOB: 1965   Date of Visit: 1/16/2024     Dear Tala Yip DO:       Thank you for referring Vivienne Barbour to me for evaluation. Below are the relevant portions of my assessment and plan of care.    If you have questions, please do not hesitate to call me. I look forward to following Vivienne along with you.         Sincerely,        Christina Ramos, MICHELLE        CC: MD Nikolay Vergara MD Egger, MICHELLE Oliva  01/16/24 0845  Sign when Signing Visit  [facility]     Referring Provider: Dr. Nikolay Hernandez     Chief complaint: breast cancer follow up     HPI:   1/18/21:  Seen by Dr Barbour  Ms. Vivienne Barbour is a 54 yo woman, seen at the request of Dr. 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 This was initially detected as an imaging abnormality on routine screening. Her work-up is detailed in the oncologic history below. Prior to the biopsy, she denies any breast lumps, pain, skin changes, or nipple discharge. She denies any prior history of abnormal mammograms or breast biopsies. She has a family history of breast cancer in her paternal grandmother (diagnosed at age 88). She denies any family history of ovarian cancer.      2/24/21:  Seen by Dr Barbour  She underwent left partial mastectomy on 2/9/21. See surgery & pathology details below in oncologic history. She has been recovering well and has no complaints.     7/14/21:  She returns today for follow up with some left breast heaviness and mild tenderness she has noticed since RT.    Last saw Dr Olvera in 3/21, contemplating taking evista dependent on bone density results.  She will discuss further when she returns to see her in 9/2021.  Completed RT on 4/1/2021.  In 4/21 she was seen in Survivorship clinic.    9/26/22:  She returns today for follow up  with continued left breast tenderness that is not limiting.      Her last clinic visit with Dr Olvera was in 8/22:  She was given a prescription for Evista though has not yet started this.    In addition her bone density was totally normal so I agree that there is no indication for the Evista currently and we will discontinue this    Screening mammogram with tomosynthesis was completed last on 12/21/21 with BiRads 2 results.  (see full report below)    1/16/24, Interval History:  She returns today for follow up with continuing left breast tenderness from cats walking on her chest. She was seen by GYN recently who feels her symptoms are c/w costochondritis, irritated by the pressure of the cat's paws.    Her last clinic visit with Dr Olvera was in 8/23:  She continues on Wellbutrin which would be a contraindication for any tamoxifen     Bilateral screening mammogram on 12/27/22 was stable, biRAds 2.  (see full report below)  Bilateral screening mammogram on 1/5/24 was stable, BiRads 2.  (see full report below)           Oncology/Hematology History Overview Note   11/07/18, Screening MMG with [facility] ([facility]):  Scattered areas of fibroglandular density. There is a stable oval mass measuring 15 millimeters seen in the posterior one-third region of the right breast at 10 o'clock. No suspicious masses, suspicious microcalcifications or new areas of architectural distortion are identified. There has been no significant change from the prior exam(s). In the left breast, no suspicious masses, significant calcifications or other abnormalities are seen.  BI-RADS 2: Benign.    11/15/19, Screening MMG with [facility] ([facility]):  Scattered areas of fibroglandular density. There is a focal asymmetry seen in the posterior one-third upper outer region of the left breast. In the right breast, no suspicious masses, significant calcifications or other abnormalities are seen.  BI-RADS 0: Incomplete.    12/17/19, Left Diagnostic MMG  with [facility] & Left Breast US ([facility]):  MMG:  On the present examination, focal asymmetry in the left breast, upper outer does not persist. With all diagnostic views, this area is not seen. No suspicious mammographic finding is seen.  US:  No suspicious sonographic finding is seen. A small incidental simple cyst with a thin smooth internal septations is present at the 1:30 location, 2 cm from the nipple. This is not the mammographic area which was evaluated and is a small incidental benign simple cyst.  BI-RADS 1: Negative.     Ductal carcinoma in situ (DCIS) of left breast   12/10/2020 Initial Diagnosis    Ductal carcinoma in situ (DCIS) of left breast     12/11/2020 Imaging    Screening MMG with [facility] ([facility]):  Scattered areas of fibroglandular density.  1. There are a few new punctate calcifications with grouped distribution seen in the middle one-third 1:30 o'clock region of the left breast.  2. There is a stable oval mass measuring 15 mm seen in the posterior one-third 10:30 o'clock region of the right breast. No suspicious masses, suspicious microcalcifications or new areas of architectural distortion are identified. There has been no significant change from the prior exam(s).  BI-RADS 0: Incomplete.     1/4/2021 Imaging    Left Diagnostic MMG with [facility] ([facility]):  On the present examination, there are new amorphous and indistinct calcifications measuring 4 mm with grouped distribution in the middle one-third 1:30 o'clock region of the left breast located 10 centimeters from the nipple. Additional imaging demonstrates microcalcifications are suspicious and tissue sampling is recommended.  Bi-RADS 4B: Suspicious.     1/11/2021 Biopsy    Left Breast, Stereotactic Biopsy ([facility]):    Breast, Left 1:30 O'clock, Core Needle Biopsy:  Ductal Carcinoma in Situ (DCIS), High Nuclear Grade, Comedo Pattern, 3 mm in Greatest Dimension, Present in 3 of 13 Cores.   Microcalcifications Associated with DCIS and Benign Mammary  Ducts/Lobules.    ER negative (0.29%)  NM negative (0%)  Ki-67 32.68%    -Tophat clip. No residual calcifications were visible on post procedure mammogram.     1/15/2021 Imaging    Bilateral Breast MRI ([facility]):  RIGHT BREAST:    At 10:00 in the posterior right breast, approximately 9 cm posterior to the nipple, there is a 1.4 cm AP dimension, 0.9 cm transverse dimension, 0.9 cm craniocaudal dimension oval enhancing mass, which is mammographically stable dating back to at least 2016. No suspicious enhancing mass or area of non-mass enhancement is identified. The visualized axilla is within normal limits.    LEFT BREAST:    At 1:00, approximately 6 cm posterior to the nipple, there is an approximately 1.2 x 0.7 x 3.1 cm biopsy cavity/tract with a central focus of susceptibility from a biopsy clip, which marks the site of  biopsy-proven malignancy. There is subtle enhancement extending from the biopsy site to the overlying skin, which is likely postbiopsy in nature. No suspicious mass or mass enhancement is identified at this location or elsewhere within the left breast. No suspicious enhancement is identified in the left nipple or chest wall. The visualized axilla is within normal limits.   EXTRAMAMMARY FINDINGS:   There are no abnormally enlarged internal mammary chain lymph nodes on either side.  BI-RADS 6: Known malignancy.     1/19/2021 Genetic Testing    [employer] Common Hereditary Cancers Panel (47 genes):    VUS in HOXB13     2/9/2021 Surgery    Left needle-localized partial mastectomy    1. Left Breast, Oriented Needle Localization Partial Mastectomy (29 grams):               A. Benign breast tissue with sclerosing adenosis, fibrocystic change and usual ductal hyperplasia with associated       calcifications (see Comment).  B. Biopsy site identified and clip retrieved.  C. No atypical hyperplasia, in situ nor invasive carcinoma identified (margins clear).     2. Left Breast, Additional Superior, Medial and  Posterior Margins, Oriented Excision:               A. Benign fatty breast tissue with focal columnar cell hyperplasia.                B. No atypical hyperplasia, in situ nor invasive carcinoma identified (margins clear).     3/1/2021 Cancer Staged    Staging form: Breast, AJCC 8th Edition  - Pathologic: Stage Unknown (pTis (DCIS), pNX, G2, ER-, OK-) - Signed by Juan Olvera MD on 3/1/2021     3/11/2021 - 4/1/2021 Radiation    Radiation OncologyTreatment Course:  Vivienne Barbour received 4256 cGy in 16 fractions to LEFT breast.      Hormonal Therapy    Evista planned after review of bone density     12/21/2021 Imaging    [facility]  There are scattered areas of fibroglandular density.  There is a new postsurgical scar with associated skin thickening seen in the 130 o'clock region of the left breast.  Postsurgical scars in area of prior lumpectomy.  Metallic surgical clips noted.  Findings are consistent with postsurgical and postradiation therapy changes.  In the right breast, no suspicious masses, significant calcifications or other abnormalities are seen.  Impression:  New postsurgical scar left breast is benign negative.  At this time there is nothing to suggest malignancy.  Screening mammogram 1 year is recommended.  BI-RADS Category 2     12/27/2022 Imaging    [facility]  There are scattered areas of fibroglandular density.  Finding 1: There is postsurgical scar with associated skin thickening seen 130 o'clock region of the left breast.  Postsurgical scars and area of prior lumpectomy.  There are no significant changes from prior exams.  Metallic surgical clips noted.  Findings are consistent with postsurgical and postradiation therapy changes.  Finding 2: There is an isodense, oval mass measuring 11 mm with circumscribed margins seen in the upper outer region of the right breast.  There are no  significant changes from prior exams.  Impression:  Finding 1: Postsurgical scar left breast is benign negative.  At this time there is nothing to suggest malignancy.  Finding 2: Mass in the right breast is benign negative.  Screening mammogram in 1 year is recommended.  BI-RADS Category 2     1/5/2024 Imaging    [facility]  There are scattered areas of fibroglandular density.  There is a stable postsurgical scar with associated rim calcifications seen in the 130 o'clock region of the left breast.  Postsurgical scar is in the area of prior lumpectomy.  Findings are consistent with postsurgical and postradiation therapy changes.  Metallic surgical clips noted.  No suspicious masses, suspicious microcalcifications or significant architectural distortions are identified.  In the right breast, no suspicious masses, significant calcifications or other abnormalities are seen.  Impression:  Stable postsurgical scar left breast is benign negative.  Screening mammogram 1 year is recommended.  BI-RADS Category 2         Review of Systems:  See interval history.       Medications:    Current Outpatient Medications:   •  acyclovir (ZOVIRAX) 400 MG tablet, Take 1 tablet by mouth As Needed. Take no more than 5 doses a day., Disp: , Rfl:   •  aspirin 81 MG tablet, Take 1 tablet by mouth Daily. HELD FOR OR, Disp: , Rfl:   •  buPROPion XL (WELLBUTRIN XL) 150 MG 24 hr tablet, , Disp: , Rfl:   •  Cholecalciferol 50 MCG (2000 UT) capsule, Take 50 mcg by mouth. 3 Times a week, Disp: , Rfl:   •  cyclobenzaprine (FLEXERIL) 5 MG tablet, Take 1 tablet by mouth 3 (Three) Times a Day As Needed for Muscle Spasms., Disp: , Rfl:   •  docusate sodium (COLACE) 250 MG capsule, Take 100 mg by mouth Daily., Disp: , Rfl:   •  ergocalciferol (ERGOCALCIFEROL) 1.25 MG (26944 UT) capsule, Take 1 capsule by mouth Every 30 (Thirty) Days., Disp: , Rfl:   •  lisinopril (PRINIVIL,ZESTRIL) 10 MG tablet, Take  "1 tablet by mouth Daily., Disp: , Rfl:   •  meloxicam (MOBIC) 15 MG tablet, Take 1 tablet by mouth Daily. HELD FOR OR, Disp: , Rfl:   •  SUMAtriptan (IMITREX) 50 MG tablet, Take 1 tablet by mouth Every 2 (Two) Hours As Needed for Migraine. Take one tablet at onset of headache. May repeat dose one time in 2 hours if headache not relieved., Disp: , Rfl:   •  topiramate (TOPAMAX) 50 MG tablet, Take 1 tablet by mouth Daily., Disp: , Rfl:   •  venlafaxine XR (EFFEXOR-XR) 75 MG 24 hr capsule, , Disp: , Rfl:       Allergies   Allergen Reactions   • Epinephrine Other (See Comments)     Shaking  Other reaction(s): Shakiness   • Vilazodone Hcl Hallucinations     hallucinations   • Thimerosal Itching   • Morphine Itching   • Sulfa Antibiotics Rash   • Thimerosal (Thiomersal) Itching       Family History   Problem Relation Age of Onset   • Hypertension Mother    • Colon polyps Mother         sigmoid colon   • Thyroid cancer Mother    • Heart disease Mother    • Stroke Mother    • Benign prostatic hyperplasia Father    • Cancer Father         bladder   • Colon polyps Father         sigmoid colon   • Hypertension Father    • Hyperlipidemia Father    • Colon polyps Brother         sigmoid colon   • Hypertension Brother    • Colon cancer Maternal Grandmother    • Colon cancer Maternal Grandfather    • Heart disease Maternal Grandfather    • Heart attack Maternal Grandfather    • Colon cancer Paternal Uncle    • Heart disease Maternal Uncle    • Stroke Paternal Grandmother    • Breast cancer Paternal Grandmother 88   • Diabetes Paternal Grandfather    • Heart attack Paternal Grandfather    • Heart disease Paternal Grandfather    • Hypertension Brother    • Colon cancer Paternal Aunt    • Malig Hyperthermia Neg Hx        PHYSICAL EXAMINATION:   Vitals:    01/16/24 0759   BP: 138/88        /88 (BP Location: Right arm)   Ht 172.7 cm (68\")   Wt 104 kg (229 lb)   BMI 34.82 kg/m²     I reviewed physical exam, no changes " noted    ECOG 0 - Asymptomatic  General: NAD, well appearing  Psych: a&o x 3, normal mood and affect  Eyes: EOMI, no scleral icterus  ENMT: neck supple without masses or thyromegaly, mucus membranes moist  MSK: normal gait, normal ROM in bilateral shoulders  Lymph nodes: no cervical, supraclavicular or axillary lymphadenopathy  Breast: symmetric, large size, grade 3 ptosis  Right: No visible abnormalities on inspection while seated, with arms raised or hands on hips. No masses, skin changes, or nipple abnormalities.  Left: Well-healed upper outer curvilinear incision. No concavity or fullness at the surgical site, mild skin changes r/t RT.  No visible abnormalities on inspection while seated, with arms raised or hands on hips. No masses, skin changes, or nipple abnormalities.        Assessment:   1)  58 y.o. 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 She is sp left partial mastectomy on 2/9/21, with no residual disease found in the surgical specimen, 3 mm on core, pTis.    Discussion:  We discussed her breast changes that she has noticed since radiation.  I advised her that these are c/w post radiation changes and should improve to some extent.  There may be some residual tenderness that persists.  I advised her to wear a sports bra at all times, a softer supportive bra at bedtime as well.  She is in agreement with this plan.    Plan:  -no longer seeing Dr Garcia  -screening with tomosynthesis in 12 months at [facility] followed by exam    -She was instructed to call sooner with any questions, concerns or changes on BSE.    Christina Ramos, MICHELLE      CC:  Dr. Nikolay Yip, DO Rita Nicole MD

## 2024-01-16 NOTE — PROGRESS NOTES
[facility]     Referring Provider: Dr. Nikolay Hernandez     Chief complaint: breast cancer follow up     HPI:   1/18/21:  Seen by Dr Barbour  Ms. Vivienne Barbour is a 56 yo woman, seen at the request of Dr. 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 This was initially detected as an imaging abnormality on routine screening. Her work-up is detailed in the oncologic history below. Prior to the biopsy, she denies any breast lumps, pain, skin changes, or nipple discharge. She denies any prior history of abnormal mammograms or breast biopsies. She has a family history of breast cancer in her paternal grandmother (diagnosed at age 88). She denies any family history of ovarian cancer.      2/24/21:  Seen by Dr Barbour  She underwent left partial mastectomy on 2/9/21. See surgery & pathology details below in oncologic history. She has been recovering well and has no complaints.     7/14/21:  She returns today for follow up with some left breast heaviness and mild tenderness she has noticed since RT.    Last saw Dr Olvera in 3/21, contemplating taking evista dependent on bone density results.  She will discuss further when she returns to see her in 9/2021.  Completed RT on 4/1/2021.  In 4/21 she was seen in Survivorship clinic.    9/26/22:  She returns today for follow up with continued left breast tenderness that is not limiting.      Her last clinic visit with Dr Olvera was in 8/22:  She was given a prescription for Evista though has not yet started this.    In addition her bone density was totally normal so I agree that there is no indication for the Evista currently and we will discontinue this    Screening mammogram with tomosynthesis was completed last on 12/21/21 with BiRads 2 results.  (see full report below)    1/16/24, Interval History:  She returns today for follow up with continuing left breast tenderness from cats walking on her chest. She was seen by GYN recently who feels  her symptoms are c/w costochondritis, irritated by the pressure of the cat's paws.    Her last clinic visit with Dr Olvera was in 8/23:  She continues on Wellbutrin which would be a contraindication for any tamoxifen     Bilateral screening mammogram on 12/27/22 was stable, biRAds 2.  (see full report below)  Bilateral screening mammogram on 1/5/24 was stable, BiRads 2.  (see full report below)           Oncology/Hematology History Overview Note   11/07/18, Screening MMG with [facility] ([facility]):  Scattered areas of fibroglandular density. There is a stable oval mass measuring 15 millimeters seen in the posterior one-third region of the right breast at 10 o'clock. No suspicious masses, suspicious microcalcifications or new areas of architectural distortion are identified. There has been no significant change from the prior exam(s). In the left breast, no suspicious masses, significant calcifications or other abnormalities are seen.  BI-RADS 2: Benign.    11/15/19, Screening MMG with [facility] ([facility]):  Scattered areas of fibroglandular density. There is a focal asymmetry seen in the posterior one-third upper outer region of the left breast. In the right breast, no suspicious masses, significant calcifications or other abnormalities are seen.  BI-RADS 0: Incomplete.    12/17/19, Left Diagnostic MMG with [facility] & Left Breast US ([facility]):  MMG:  On the present examination, focal asymmetry in the left breast, upper outer does not persist. With all diagnostic views, this area is not seen. No suspicious mammographic finding is seen.  US:  No suspicious sonographic finding is seen. A small incidental simple cyst with a thin smooth internal septations is present at the 1:30 location, 2 cm from the nipple. This is not the mammographic area which was evaluated and is a small incidental benign simple cyst.  BI-RADS 1: Negative.     Ductal carcinoma in situ (DCIS) of left breast   12/10/2020 Initial Diagnosis    Ductal carcinoma in  situ (DCIS) of left breast     12/11/2020 Imaging    Screening MMG with [facility] ([facility]):  Scattered areas of fibroglandular density.  1. There are a few new punctate calcifications with grouped distribution seen in the middle one-third 1:30 o'clock region of the left breast.  2. There is a stable oval mass measuring 15 mm seen in the posterior one-third 10:30 o'clock region of the right breast. No suspicious masses, suspicious microcalcifications or new areas of architectural distortion are identified. There has been no significant change from the prior exam(s).  BI-RADS 0: Incomplete.     1/4/2021 Imaging    Left Diagnostic MMG with [facility] ([facility]):  On the present examination, there are new amorphous and indistinct calcifications measuring 4 mm with grouped distribution in the middle one-third 1:30 o'clock region of the left breast located 10 centimeters from the nipple. Additional imaging demonstrates microcalcifications are suspicious and tissue sampling is recommended.  Bi-RADS 4B: Suspicious.     1/11/2021 Biopsy    Left Breast, Stereotactic Biopsy ([facility]):    Breast, Left 1:30 O'clock, Core Needle Biopsy:  Ductal Carcinoma in Situ (DCIS), High Nuclear Grade, Comedo Pattern, 3 mm in Greatest Dimension, Present in 3 of 13 Cores.   Microcalcifications Associated with DCIS and Benign Mammary Ducts/Lobules.    ER negative (0.29%)  MT negative (0%)  Ki-67 32.68%    -Tophat clip. No residual calcifications were visible on post procedure mammogram.     1/15/2021 Imaging    Bilateral Breast MRI ([facility]):  RIGHT BREAST:    At 10:00 in the posterior right breast, approximately 9 cm posterior to the nipple, there is a 1.4 cm AP dimension, 0.9 cm transverse dimension, 0.9 cm craniocaudal dimension oval enhancing mass, which is mammographically stable dating back to at least 2016. No suspicious enhancing mass or area of non-mass enhancement is identified. The visualized axilla is within normal limits.    LEFT BREAST:     At 1:00, approximately 6 cm posterior to the nipple, there is an approximately 1.2 x 0.7 x 3.1 cm biopsy cavity/tract with a central focus of susceptibility from a biopsy clip, which marks the site of  biopsy-proven malignancy. There is subtle enhancement extending from the biopsy site to the overlying skin, which is likely postbiopsy in nature. No suspicious mass or mass enhancement is identified at this location or elsewhere within the left breast. No suspicious enhancement is identified in the left nipple or chest wall. The visualized axilla is within normal limits.   EXTRAMAMMARY FINDINGS:   There are no abnormally enlarged internal mammary chain lymph nodes on either side.  BI-RADS 6: Known malignancy.     1/19/2021 Genetic Testing    [employer] Common Hereditary Cancers Panel (47 genes):    VUS in HOXB13     2/9/2021 Surgery    Left needle-localized partial mastectomy    1. Left Breast, Oriented Needle Localization Partial Mastectomy (29 grams):               A. Benign breast tissue with sclerosing adenosis, fibrocystic change and usual ductal hyperplasia with associated       calcifications (see Comment).  B. Biopsy site identified and clip retrieved.  C. No atypical hyperplasia, in situ nor invasive carcinoma identified (margins clear).     2. Left Breast, Additional Superior, Medial and Posterior Margins, Oriented Excision:               A. Benign fatty breast tissue with focal columnar cell hyperplasia.                B. No atypical hyperplasia, in situ nor invasive carcinoma identified (margins clear).     3/1/2021 Cancer Staged    Staging form: Breast, AJCC 8th Edition  - Pathologic: Stage Unknown (pTis (DCIS), pNX, G2, ER-, MO-) - Signed by Juan Olvera MD on 3/1/2021     3/11/2021 - 4/1/2021 Radiation    Radiation OncologyTreatment Course:  Vivienne Barbour received 4256 cGy in 16 fractions to LEFT breast.      Hormonal Therapy    Evista planned after review of bone density     12/21/2021  Imaging    [facility]  There are scattered areas of fibroglandular density.  There is a new postsurgical scar with associated skin thickening seen in the 130 o'clock region of the left breast.  Postsurgical scars in area of prior lumpectomy.  Metallic surgical clips noted.  Findings are consistent with postsurgical and postradiation therapy changes.  In the right breast, no suspicious masses, significant calcifications or other abnormalities are seen.  Impression:  New postsurgical scar left breast is benign negative.  At this time there is nothing to suggest malignancy.  Screening mammogram 1 year is recommended.  BI-RADS Category 2     12/27/2022 Imaging    [facility]  There are scattered areas of fibroglandular density.  Finding 1: There is postsurgical scar with associated skin thickening seen 130 o'clock region of the left breast.  Postsurgical scars and area of prior lumpectomy.  There are no significant changes from prior exams.  Metallic surgical clips noted.  Findings are consistent with postsurgical and postradiation therapy changes.  Finding 2: There is an isodense, oval mass measuring 11 mm with circumscribed margins seen in the upper outer region of the right breast.  There are no significant changes from prior exams.  Impression:  Finding 1: Postsurgical scar left breast is benign negative.  At this time there is nothing to suggest malignancy.  Finding 2: Mass in the right breast is benign negative.  Screening mammogram in 1 year is recommended.  BI-RADS Category 2     1/5/2024 Imaging    [facility]  There are scattered areas of fibroglandular density.  There is a stable postsurgical scar with associated rim calcifications seen in the 130 o'clock region of the left breast.  Postsurgical scar is in the area of prior lumpectomy.  Findings are  consistent with postsurgical and postradiation therapy changes.  Metallic surgical clips noted.  No suspicious masses, suspicious microcalcifications or significant architectural distortions are identified.  In the right breast, no suspicious masses, significant calcifications or other abnormalities are seen.  Impression:  Stable postsurgical scar left breast is benign negative.  Screening mammogram 1 year is recommended.  BI-RADS Category 2         Review of Systems:  See interval history.       Medications:    Current Outpatient Medications:     acyclovir (ZOVIRAX) 400 MG tablet, Take 1 tablet by mouth As Needed. Take no more than 5 doses a day., Disp: , Rfl:     aspirin 81 MG tablet, Take 1 tablet by mouth Daily. HELD FOR OR, Disp: , Rfl:     buPROPion XL (WELLBUTRIN XL) 150 MG 24 hr tablet, , Disp: , Rfl:     Cholecalciferol 50 MCG (2000 UT) capsule, Take 50 mcg by mouth. 3 Times a week, Disp: , Rfl:     cyclobenzaprine (FLEXERIL) 5 MG tablet, Take 1 tablet by mouth 3 (Three) Times a Day As Needed for Muscle Spasms., Disp: , Rfl:     docusate sodium (COLACE) 250 MG capsule, Take 100 mg by mouth Daily., Disp: , Rfl:     ergocalciferol (ERGOCALCIFEROL) 1.25 MG (07294 UT) capsule, Take 1 capsule by mouth Every 30 (Thirty) Days., Disp: , Rfl:     lisinopril (PRINIVIL,ZESTRIL) 10 MG tablet, Take 1 tablet by mouth Daily., Disp: , Rfl:     meloxicam (MOBIC) 15 MG tablet, Take 1 tablet by mouth Daily. HELD FOR OR, Disp: , Rfl:     SUMAtriptan (IMITREX) 50 MG tablet, Take 1 tablet by mouth Every 2 (Two) Hours As Needed for Migraine. Take one tablet at onset of headache. May repeat dose one time in 2 hours if headache not relieved., Disp: , Rfl:     topiramate (TOPAMAX) 50 MG tablet, Take 1 tablet by mouth Daily., Disp: , Rfl:     venlafaxine XR (EFFEXOR-XR) 75 MG 24 hr capsule, , Disp: , Rfl:       Allergies   Allergen Reactions    Epinephrine Other (See Comments)     Shaking  Other reaction(s): Shakiness    Vilazodone  "Hcl Hallucinations     hallucinations    Thimerosal Itching    Morphine Itching    Sulfa Antibiotics Rash    Thimerosal (Thiomersal) Itching       Family History   Problem Relation Age of Onset    Hypertension Mother     Colon polyps Mother         sigmoid colon    Thyroid cancer Mother     Heart disease Mother     Stroke Mother     Benign prostatic hyperplasia Father     Cancer Father         bladder    Colon polyps Father         sigmoid colon    Hypertension Father     Hyperlipidemia Father     Colon polyps Brother         sigmoid colon    Hypertension Brother     Colon cancer Maternal Grandmother     Colon cancer Maternal Grandfather     Heart disease Maternal Grandfather     Heart attack Maternal Grandfather     Colon cancer Paternal Uncle     Heart disease Maternal Uncle     Stroke Paternal Grandmother     Breast cancer Paternal Grandmother 88    Diabetes Paternal Grandfather     Heart attack Paternal Grandfather     Heart disease Paternal Grandfather     Hypertension Brother     Colon cancer Paternal Aunt     Malig Hyperthermia Neg Hx        PHYSICAL EXAMINATION:   Vitals:    01/16/24 0759   BP: 138/88        /88 (BP Location: Right arm)   Ht 172.7 cm (68\")   Wt 104 kg (229 lb)   BMI 34.82 kg/m²     I reviewed physical exam, no changes noted    ECOG 0 - Asymptomatic  General: NAD, well appearing  Psych: a&o x 3, normal mood and affect  Eyes: EOMI, no scleral icterus  ENMT: neck supple without masses or thyromegaly, mucus membranes moist  MSK: normal gait, normal ROM in bilateral shoulders  Lymph nodes: no cervical, supraclavicular or axillary lymphadenopathy  Breast: symmetric, large size, grade 3 ptosis  Right: No visible abnormalities on inspection while seated, with arms raised or hands on hips. No masses, skin changes, or nipple abnormalities.  Left: Well-healed upper outer curvilinear incision. No concavity or fullness at the surgical site, mild skin changes r/t RT.  No visible abnormalities on " inspection while seated, with arms raised or hands on hips. No masses, skin changes, or nipple abnormalities.        Assessment:   1)  58 y.o. 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 She is sp left partial mastectomy on 2/9/21, with no residual disease found in the surgical specimen, 3 mm on core, pTis.    Discussion:  We discussed her breast changes that she has noticed since radiation.  I advised her that these are c/w post radiation changes and should improve to some extent.  There may be some residual tenderness that persists.  I advised her to wear a sports bra at all times, a softer supportive bra at bedtime as well.  She is in agreement with this plan.    Plan:  -no longer seeing Dr Garcia  -screening with tomosynthesis in 12 months at [facility] followed by exam    -She was instructed to call sooner with any questions, concerns or changes on BSE.    MICHELLE Kent      CC:  DO Rita Vizcaino MD

## 2024-01-16 NOTE — TELEPHONE ENCOUNTER
Patient stated she tried to schedule her MGM and office visit at [facility] but her Dr is not scheduling out that far right now. I will call later in the year to see when she is scheduled and make her next appt to come back in this office.

## 2024-07-15 ENCOUNTER — TELEPHONE (OUTPATIENT)
Dept: SURGERY | Facility: CLINIC | Age: 59
End: 2024-07-15
Payer: COMMERCIAL

## 2024-07-15 NOTE — TELEPHONE ENCOUNTER
Spoke to patient about getting her MGM and FU visit scheduled. When we last spoke, her provider, Dr. Nicole, at [facility] was not scheduling yet for 1/2025. I called today to try and schedule and they were still not scheduling for Dr. Nicole. Patient is due for her mammogram 1/6/25 or later. She cannot do Mondays.

## 2025-02-04 ENCOUNTER — TELEPHONE (OUTPATIENT)
Dept: SURGERY | Facility: CLINIC | Age: 60
End: 2025-02-04
Payer: COMMERCIAL

## 2025-02-04 NOTE — TELEPHONE ENCOUNTER
Spoke to someone I [facility] office and let them know I have talked to pt and she does not need to be seen in high risk since she did all her treatments at time of dx

## 2025-02-04 NOTE — TELEPHONE ENCOUNTER
Pt was referred to high risk she has DCIS in 2021 and since she did all her treatments at time of dx she does not need to fu in high risk     I told her I would let clovis jaimes know

## 2025-05-09 ENCOUNTER — OFFICE VISIT (OUTPATIENT)
Dept: NEUROLOGY | Facility: CLINIC | Age: 60
End: 2025-05-09
Payer: COMMERCIAL

## 2025-05-09 VITALS
SYSTOLIC BLOOD PRESSURE: 104 MMHG | HEIGHT: 68 IN | WEIGHT: 234 LBS | BODY MASS INDEX: 35.46 KG/M2 | OXYGEN SATURATION: 97 % | DIASTOLIC BLOOD PRESSURE: 70 MMHG | HEART RATE: 88 BPM

## 2025-05-09 DIAGNOSIS — G25.0 BENIGN ESSENTIAL TREMOR: ICD-10-CM

## 2025-05-09 DIAGNOSIS — R42 DIZZINESS: ICD-10-CM

## 2025-05-09 DIAGNOSIS — R41.3 MEMORY LOSS: Primary | ICD-10-CM

## 2025-05-09 RX ORDER — FEXOFENADINE HCL 180 MG/1
180 TABLET ORAL DAILY
COMMUNITY

## 2025-05-09 RX ORDER — VENLAFAXINE HYDROCHLORIDE 37.5 MG/1
37.5 CAPSULE, EXTENDED RELEASE ORAL DAILY
COMMUNITY
Start: 2025-04-16

## 2025-05-09 NOTE — PROGRESS NOTES
Chief Complaint   Patient presents with    Cognitive Impairment    Tremors       Patient ID: Vivienne Barbour is a 60 y.o. female.    HPI: I have had the pleasure of seeing your patient today.  As you may know she is a 60-year-old female being seen at the request of and referred to us by Dr. Tala Yip for history of dizziness and cognitive impairment.  She is also here for tremor.  She apparently began having issues with her short-term memory a year or so ago.  It is usually an issue where she has forgotten a conversation.  She has also had issues where she forgets dates and times of important events.  She has never left the stove running or the water on.  She works and typically has no issues with the processes at work.  She does drive and has no issues driving typically.  She denies any family history of dementia.  She states that she did have a head injury a few years ago where she fell down some steps hitting her head.  No loss of consciousness.  She has had episodes of dizziness since then.  She describes it as a movement like sensation that worsens with change of position.  It has improved some over the course of the past few years however still she does incur some symptoms.  She also has a tremor.  She describes the tremor as a movement of both hands.  Her hands become shaky when she is using eating utensils.  She also notes that they get shaky when she points to something or is holding a piece of paper.  No family history of tremor.  No family history of Parkinson's disease.    The following portions of the patient's history were reviewed and updated as appropriate: allergies, current medications, past family history, past medical history, past social history, past surgical history and problem list.    Review of Systems   Constitutional:  Positive for activity change and fatigue.   Neurological:  Positive for tremors and numbness (braden. hands). Negative for dizziness, seizures, syncope, facial asymmetry,  speech difficulty, weakness, light-headedness and headaches.   Psychiatric/Behavioral:  Positive for dysphoric mood. Negative for agitation, behavioral problems, confusion, decreased concentration, hallucinations, self-injury, sleep disturbance and suicidal ideas. The patient is not nervous/anxious and is not hyperactive.       I have reviewed the review of systems above performed by my medical assistant.      Vitals:    05/09/25 1457   BP: 104/70   Pulse: 88   SpO2: 97%       Neurological Exam  Mental Status  Awake, alert and oriented to person, place and time. Recent and remote memory are intact. Speech is normal. Language is fluent with no aphasia. Attention and concentration are normal. Fund of knowledge is appropriate for level of education.    Cranial Nerves  CN I: Sense of smell is normal.  CN II: Visual acuity is normal.  CN III, IV, VI: Extraocular movements intact bilaterally. Pupils equal round and reactive to light bilaterally.  CN V: Facial sensation is normal.  CN VII: Full and symmetric facial movement.  CN XI: Shoulder shrug strength is normal.  CN XII: Tongue midline without atrophy or fasciculations.    Motor  Normal muscle bulk throughout. No fasciculations present. Normal muscle tone. The following abnormal movements were seen: Essential tremor noted in both upper extremities..   No pronator drift.                                             Right                     Left  Rhomboids                            5                          5  Infraspinatus                          5                          5  Supraspinatus                       5                          5  Deltoid                                   5                          5   Biceps                                   5                          5  Brachioradialis                      5                          5   Triceps                                  5                          5   Pronator                                5                           5   Supinator                              5                           5   Wrist flexor                            5                          5   Wrist extensor                       5                          5   Finger flexor                          5                          5   Finger extensor                     5                          5   Interossei                              5                          5   Abductor pollicis brevis         5                          5   Flexor pollicis brevis             5                          5   Opponens pollicis                 5                          5  Extensor digitorum               5                          5  Abductor digiti minimi           5                          5   Abdominal                            5                          5  Glutei                                    5                          5  Hip abductor                         5                          5  Hip adductor                         5                          5   Iliopsoas                               5                          5   Quadriceps                           5                          5   Hamstring                             5                          5   Gastrocnemius                     5                           5   Anterior tibialis                      5                          5   Posterior tibialis                    5                          5   Peroneal                               5                          5  Ankle dorsiflexor                   5                          5  Ankle plantar flexor              5                           5  Extensor hallucis longus      5                           5    Sensory  Sensation is intact to light touch, pinprick, vibration and proprioception in all four extremities.    Reflexes  Deep tendon reflexes are 2+ and symmetric in all four extremities.    Right pathological reflexes: Nithya's  absent.  Left pathological reflexes: Nithya's absent.    Coordination    Finger-to-nose, rapid alternating movements and heel-to-shin normal bilaterally without dysmetria.    Gait  Normal casual, toe, heel and tandem gait.       Physical Exam  Vitals reviewed.   Constitutional:       General: She is not in acute distress.     Appearance: She is well-developed.   HENT:      Head: Normocephalic and atraumatic.   Eyes:      Extraocular Movements: Extraocular movements intact.      Pupils: Pupils are equal, round, and reactive to light.   Cardiovascular:      Rate and Rhythm: Normal rate and regular rhythm.      Heart sounds: Normal heart sounds.   Pulmonary:      Effort: Pulmonary effort is normal. No respiratory distress.      Breath sounds: Normal breath sounds.   Abdominal:      General: Bowel sounds are normal. There is no distension.      Palpations: Abdomen is soft.      Tenderness: There is no abdominal tenderness.   Musculoskeletal:         General: No deformity.      Cervical back: Normal range of motion.   Skin:     General: Skin is warm.      Findings: No rash.   Neurological:      Coordination: Coordination is intact.      Deep Tendon Reflexes: Reflexes are normal and symmetric.   Psychiatric:         Speech: Speech normal.         Judgment: Judgment normal.         Procedures    Assessment/Plan: We are going to schedule MRI imaging of the brain as well as an MRA of the head and neck.  I would like to check a vitamin B12 level today.  We will refer her to the [facility] [place] for her history of dizziness.  Neuropsych testing.  Return to clinicafter testing is been completed.  A total of 60 minutes was spent face-to-face with the patient today.  Of that greater than 50% of this time was spent discussing signs and symptoms of those stated below, patient education, plan of care and prognosis.         Diagnoses and all orders for this visit:    1. Memory loss (Primary)  -     Vitamin B12  -     MRI  Brain With & Without Contrast; Future  -     Ambulatory Referral to Neuropsychology    2. Benign essential tremor    3. Dizziness  -     MRI Brain With & Without Contrast; Future  -     MRI angiogram head w wo contrast; Future  -     MRI Angiogram Neck Without Contrast; Future  -     Basic Vestibular Evaluation; Future           Lowell Valencia II, MD

## 2025-05-10 LAB — VIT B12 SERPL-MCNC: 195 PG/ML (ref 211–946)

## 2025-05-12 ENCOUNTER — PATIENT ROUNDING (BHMG ONLY) (OUTPATIENT)
Dept: NEUROLOGY | Facility: CLINIC | Age: 60
End: 2025-05-12
Payer: COMMERCIAL

## 2025-05-14 ENCOUNTER — RESULTS FOLLOW-UP (OUTPATIENT)
Dept: NEUROLOGY | Facility: CLINIC | Age: 60
End: 2025-05-14
Payer: COMMERCIAL

## 2025-05-14 DIAGNOSIS — E53.8 B12 DEFICIENCY: Primary | ICD-10-CM

## 2025-05-19 ENCOUNTER — TELEPHONE (OUTPATIENT)
Dept: NEUROLOGY | Facility: CLINIC | Age: 60
End: 2025-05-19
Payer: COMMERCIAL

## 2025-05-19 NOTE — TELEPHONE ENCOUNTER
STELLA AT [facility] IS REQUESTING MRI ORDERS BE FAXED TO HER AT:[phone]

## 2025-05-20 RX ORDER — CYANOCOBALAMIN, ISOPROPYL ALCOHOL 1000MCG/ML
KIT INJECTION
Qty: 12 EACH | Refills: 0 | Status: SHIPPED | OUTPATIENT
Start: 2025-05-20

## 2025-05-20 NOTE — TELEPHONE ENCOUNTER
Pt request b12 be sent to [facility] pharmacy. Medication sent okay per santhosh. Pt nofified through [employer].

## 2025-05-21 NOTE — TELEPHONE ENCOUNTER
Caller: STELLA    Relationship: Provider    Best call back number: [phone]    What orders are you requesting (i.e. lab or imaging): MRA HEAD W & W/O     In what timeframe would the patient need to come in: 5/28    Where will you receive your lab/imaging services: [facility]    Additional notes: [facility] HAS THE TEST SCHEDULED FOR THE PATIENT BUT DOES NOT HAVE THE ORDERS SIGNED BY PROVIDER. PLEASE SEND SIGNED ORDERS TO [phone] ASAP

## 2025-05-23 ENCOUNTER — TELEPHONE (OUTPATIENT)
Dept: NEUROLOGY | Facility: CLINIC | Age: 60
End: 2025-05-23

## 2025-05-23 NOTE — TELEPHONE ENCOUNTER
Caller: STELLA    Relationship: W/ [employer] (PT'S RADIOLOGY BENEFITS)     Best call back number: [phone]    What form or medical record are you requesting: MRA ORDERS    Who is requesting this form or medical record from you: [employer]    How would you like to receive the form or medical records (pick-up, mail, fax): FAX  If fax, what is the fax number: [phone]    Timeframe paperwork needed: ASAP    Additional notes: PT SCHEDULED FOR MRA SCANS TO BE COMPLETED AT [facility] ON WEDNESDAY, 5/28/25, IN THE MORNING.    STELLA STATES THEY HAVE PT'S MRA AUTHORIZATIONS BUT NOT THE ACTUAL ORDERS.    PLEASE REVIEW AND ADVISE.

## 2025-05-28 ENCOUNTER — TELEPHONE (OUTPATIENT)
Dept: NEUROLOGY | Facility: CLINIC | Age: 60
End: 2025-05-28
Payer: COMMERCIAL

## 2025-05-28 NOTE — TELEPHONE ENCOUNTER
Caller: [facility] / STELLA    Relationship: MRI PROVIDER    Best call back number: [phone]    What was the call regarding: STELLA WITH [facility] CALLED IN. THEY RECEIVED ORDERS FOR MRI OF HEAD, MRI OF THE BRAIN, AND NECK. THEY HAVE ONLY RECEIVED AND AUTH FOR THE MRI OF THE HEAD. CAN WE CHECK ON THE STATUS OF THE OTHER 2 AUTHS PLEASE?    PLEASE REVIEW AND ADVISE

## 2025-05-30 ENCOUNTER — TELEPHONE (OUTPATIENT)
Dept: NEUROLOGY | Facility: CLINIC | Age: 60
End: 2025-05-30
Payer: COMMERCIAL

## 2025-05-30 NOTE — TELEPHONE ENCOUNTER
Caller: [facility] / STELLA     Relationship: MRI PROVIDER     Best call back number: [phone] AND ASK FOR STELLA     What was the call regarding: STELLA WITH [facility] CALLED AGAIN. THEY RECEIVED ORDERS FOR MRI OF HEAD, MRI OF THE BRAIN, AND NECK.     THEY HAVE ONLY RECEIVED AND AUTH FOR THE MRI OF THE HEAD. THE PROCEDURE CODE THAT WAS USED WAS FOR ONLY THE HEAD NOT NECK.    WHAT IS THE STATUS OF THE OTHER 2 AUTHS PLEASE?     PLEASE CALL STELLA OR THE PT WITH THE STATUS OF THE PRIOR AUTHS.    SHE PREVIOUS TE NOTE ON THIS.

## 2025-06-03 ENCOUNTER — TELEPHONE (OUTPATIENT)
Dept: NEUROLOGY | Facility: CLINIC | Age: 60
End: 2025-06-03

## 2025-06-03 NOTE — TELEPHONE ENCOUNTER
Spoke with pharmacy clarified all questions needed for b12 injections. They updated everything in the system. They are going to process script. I called and let patient know they are processing and get it ready for her. I told her to call if there any other issues and ask for me. Pt stated understanding.

## 2025-06-03 NOTE — TELEPHONE ENCOUNTER
Caller: STELLA KOHLI/ [facility]  Best call back number: [phone]     What was the call regarding: HER RADIOLOGY BENEFIT IS CALLING TO SEE IF THE AUTH FOR THE MRI OF THE NECK AND THE BRAIN HAS BEEN SUBMITTED. THEY HAVE THE AUTH FOR THE HEAD AND THE ORDERS FOR ALL THREE IMAGING ORDERS. THEY JUST NEED THE AUTHS FOR THE OTHER TWO    THE PT IS IN [place] AND THEY ASK FOR SOMEONE FROM THE OFFICE REACH OUT TO HER TO GIVE AN UPDATE    PLEASE REVIEW AND ADVISE  THANK YOU

## 2025-06-03 NOTE — TELEPHONE ENCOUNTER
Caller: Vivienne Barbour    Relationship: Self    Best call back number:   Telephone Information:   Mobile [phone]       What is the best time to reach you: ANY TIME, CAN LVM IF NO ANS    What was the call regarding: PT WANTS TO KNOW STATUS OF VITAMIN B12 INJECTION    Is it okay if the provider responds through MyChart: YES      PLEASE REVIEW AND ADVISE

## 2025-06-04 NOTE — TELEPHONE ENCOUNTER
Caller: STELLA KOHLI/ [facility]    Relationship:     Best call back number: [phone]    What is the best time to reach you: ANY    Who are you requesting to speak with (clinical staff, provider,  specific staff member): ANY    What was the call regarding: CALLED REGARDING AUTH. STATES THEY GOT AUTH FOR MRA OF HEAD BUT AUTH FOR NECK WAS NOT INCLUDED. WANTED TO CHECK STATUS & SEE IF NECK STILL NEEDS TO BE INCLUDED. PLEASE ADVISE, THANK YOU.

## 2025-06-12 ENCOUNTER — TELEPHONE (OUTPATIENT)
Dept: NEUROLOGY | Facility: CLINIC | Age: 60
End: 2025-06-12

## 2025-06-14 ENCOUNTER — APPOINTMENT (OUTPATIENT)
Dept: GENERAL RADIOLOGY | Facility: HOSPITAL | Age: 60
End: 2025-06-14
Payer: COMMERCIAL

## 2025-06-14 ENCOUNTER — HOSPITAL ENCOUNTER (EMERGENCY)
Facility: HOSPITAL | Age: 60
Discharge: HOME | End: 2025-06-14
Attending: EMERGENCY MEDICINE
Payer: COMMERCIAL

## 2025-06-14 VITALS
RESPIRATION RATE: 18 BRPM | OXYGEN SATURATION: 100 % | TEMPERATURE: 97.8 F | HEART RATE: 86 BPM | DIASTOLIC BLOOD PRESSURE: 87 MMHG | SYSTOLIC BLOOD PRESSURE: 152 MMHG

## 2025-06-14 DIAGNOSIS — S93.409A SPRAIN OF ANKLE, UNSPECIFIED LATERALITY, UNSPECIFIED LIGAMENT, INITIAL ENCOUNTER: ICD-10-CM

## 2025-06-14 DIAGNOSIS — S50.319A ABRASION OF ELBOW, UNSPECIFIED LATERALITY, INITIAL ENCOUNTER: ICD-10-CM

## 2025-06-14 DIAGNOSIS — S20.212A RIB CONTUSION, LEFT, INITIAL ENCOUNTER: Primary | ICD-10-CM

## 2025-06-14 PROCEDURE — 99283 EMERGENCY DEPT VISIT LOW MDM: CPT

## 2025-06-14 PROCEDURE — 96372 THER/PROPH/DIAG INJ SC/IM: CPT

## 2025-06-14 PROCEDURE — 25010000002 KETOROLAC TROMETHAMINE PER 15 MG: Performed by: EMERGENCY MEDICINE

## 2025-06-14 PROCEDURE — 71101 X-RAY EXAM UNILAT RIBS/CHEST: CPT

## 2025-06-14 RX ORDER — KETOROLAC TROMETHAMINE 30 MG/ML
30 INJECTION, SOLUTION INTRAMUSCULAR; INTRAVENOUS ONCE
Status: COMPLETED | OUTPATIENT
Start: 2025-06-14 | End: 2025-06-14

## 2025-06-14 RX ADMIN — KETOROLAC TROMETHAMINE 30 MG: 30 INJECTION INTRAMUSCULAR; INTRAVENOUS at 19:01

## 2025-06-14 NOTE — DISCHARGE INSTRUCTIONS
Rest as needed.  May take Tylenol or ibuprofen every 8-12 hours as needed.  Please return to the emergency room for any worsening symptoms or concerns.

## 2025-06-14 NOTE — Clinical Note
[facility] EMERGENCY DEPARTMENT  [address] [facility] [address]  Phone: [phone]    Vivienne Barbour was seen and treated in our emergency department on 6/14/2025.  She may return to work on 06/17/2025.         Thank you for choosing [facility].    Jadon Guzman MD

## 2025-06-14 NOTE — ED PROVIDER NOTES
EMERGENCY DEPARTMENT ENCOUNTER  Room Number:  31/31  PCP: Tala Yip DO  Independent Historians: Patient      HPI:  Chief Complaint: had concerns including Fall and Rib Pain.     A complete HPI/ROS/PMH/PSH/SH/FH are unobtainable due to: None    Chronic or social conditions impacting patient care (Social Determinants of Health): None      Context: Vivienne Barbour is a 60 y.o. female with a medical history of acid reflux, migraines and arthritis who presents to the ED c/o acute left-sided rib injury after an accidental fall.  [place]  As she was getting a boat out of the water with her daughter, she slipped on the boat dock and landed against her left-sided back and ribs causing some immediate injury and pain there.  She also struck her face and her bilateral elbows and she thinks she may have sprained her ankle as well.  Right now her greatest pain seems to be in the left posterior ribs area.  She says the pain is worse with any deep breathing or moving.  She did not have any loss of consciousness.  She denies dizziness.  Denies nausea or vomiting.      Review of prior external notes (non-ED) -and- Review of prior external test results outside of this encounter: I independently reviewed the family medicine progress note from May 15, 2025.  Patient presented for workup of acute cough and pharyngitis symptoms.  Had flu and strep test performed.  She was prescribed azithromycin for diagnosis of acute sinusitis.  Also had refill for triamcinolone ointment.    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     Resolved Ambulatory Problems     Diagnosis Date Noted    No Resolved Ambulatory Problems     Past Medical History:   Diagnosis Date    Acid reflux disease     Allergic rhinitis      Anxiety and depression     Arthritis     Bladder spasm     Breast cancer, left     History of 2019 novel coronavirus disease (COVID-19)     Low back pain     Migraines     Osteopenia     Palpitation     Polyp of sigmoid colon     PONV (postoperative nausea and vomiting)     Post-menopausal     Vitamin D deficiency          PAST SURGICAL HISTORY  Past Surgical History:   Procedure Laterality Date    BREAST BIOPSY      BREAST LUMPECTOMY Left 2/9/2021    Procedure: Left needle-localized partial mastectomy;  Surgeon: Fernanda Barbour MD;  Location: [facility] MAIN OR;  Service: General;  Laterality: Left;    COLONOSCOPY  05/2015    Dr. Ramires    CYSTOSCOPY      diagnostic, Dr. Dominique [facility]    DIAGNOSTIC LAPAROSCOPY  1991    HYSTERECTOMY  2006    bladder track, recrocele/cystocele/endometriosis    KNEE ARTHROSCOPY Left          FAMILY HISTORY  Family History   Problem Relation Age of Onset    Hypertension Mother     Colon polyps Mother         sigmoid colon    Thyroid cancer Mother     Heart disease Mother     Stroke Mother     Benign prostatic hyperplasia Father     Cancer Father         bladder    Colon polyps Father         sigmoid colon    Hypertension Father     Hyperlipidemia Father     Colon polyps Brother         sigmoid colon    Hypertension Brother     Colon cancer Maternal Grandmother     Colon cancer Maternal Grandfather     Heart disease Maternal Grandfather     Heart attack Maternal Grandfather     Colon cancer Paternal Uncle     Heart disease Maternal Uncle     Stroke Paternal Grandmother     Breast cancer Paternal Grandmother 88    Diabetes Paternal Grandfather     Heart attack Paternal Grandfather     Heart disease Paternal Grandfather     Hypertension Brother     Colon cancer Paternal Aunt     Malig Hyperthermia Neg Hx          SOCIAL HISTORY  Social History     Socioeconomic History    Marital status:     Number of children: 2    Years of education: College   Tobacco Use    Smoking status:  Never    Smokeless tobacco: Never   Vaping Use    Vaping status: Never Used   Substance and Sexual Activity    Alcohol use: Yes     Comment: one or two drinks a year    Drug use: Never    Sexual activity: Defer         ALLERGIES  Epinephrine, Vilazodone hcl, Thimerosal, Morphine, Sulfa antibiotics, and Thimerosal (thiomersal)      REVIEW OF SYSTEMS  Review of Systems  Included in HPI  All systems reviewed and negative except for those discussed in HPI.      PHYSICAL EXAM    I have reviewed the triage vital signs and nursing notes.    ED Triage Vitals   Temp Heart Rate Resp BP SpO2   06/14/25 1641 06/14/25 1641 06/14/25 1641 06/14/25 1642 06/14/25 1641   97.8 °F (36.6 °C) 89 16 149/85 92 %      Temp src Heart Rate Source Patient Position BP Location FiO2 (%)   -- -- -- -- --              Physical Exam  GENERAL: alert, appears uncomfortable but, no acute distress  SKIN: Warm, dry  HENT: Normocephalic, atraumatic  EYES: no scleral icterus, normal conjunctivae  CV: regular rhythm, regular rate, normal pulses  RESPIRATORY: normal effort, lungs clear bilaterally, no stridor.  There is moderate tenderness to palpation in the left posterior lateral chest wall at the inferior ribs area.  There is a visible oblique pattern of bruising noted in this area.  ABDOMEN: soft, nondistended, nontender  MUSCULOSKELETAL: no deformity, mild superficial abrasions noted to the posterior elbows.  Normal range of motion and strength noted to all joints of all extremities  NEURO: alert, moves all extremities, follows commands      LAB RESULTS  No results found for this or any previous visit (from the past 24 hours).      RADIOLOGY  XR Ribs Left With PA Chest  Result Date: 6/14/2025  XR RIBS LEFT W PA CHEST-   HISTORY: fall  COMPARISON: none  TECHNIQUE: PA chest with 4 views left ribs.  FINDINGS:  Chest: Lungs are normally aerated, heart size normal, no acute findings.  Left ribs: Normal, no fracture, no acute findings.       Normal.   This  "report was finalized on 6/14/2025 5:25 PM by Dr. Miguel Weaver M.D on Workstation: [id number]          MEDICATIONS GIVEN IN ER  Medications   ketorolac (TORADOL) injection 30 mg (30 mg Intramuscular Given 6/14/25 1901)         ORDERS PLACED DURING THIS VISIT:  Orders Placed This Encounter   Procedures    XR Ribs Left With PA Chest         OUTPATIENT MEDICATION MANAGEMENT:  No current Epic-ordered facility-administered medications on file.     Current Outpatient Medications Ordered in Epic   Medication Sig Dispense Refill    acyclovir (ZOVIRAX) 400 MG tablet Take 1 tablet by mouth As Needed. Take no more than 5 doses a day.      aspirin 81 MG tablet Take 1 tablet by mouth Daily. HELD FOR OR      buPROPion XL (WELLBUTRIN XL) 150 MG 24 hr tablet Take 1 tablet by mouth Daily.      Cholecalciferol 50 MCG (2000 UT) capsule Take 50 mcg by mouth. 3 Times a week      Cyanocobalamin (B-12 Compliance Injection) 1000 MCG/ML kit Inject 1 ml IM Q week X4 weeks then QOW x 4 months total of 12 injections 12 each 0    cyclobenzaprine (FLEXERIL) 5 MG tablet Take 1 tablet by mouth 3 (Three) Times a Day As Needed for Muscle Spasms.      docusate sodium (COLACE) 250 MG capsule Take 100 mg by mouth Daily.      ergocalciferol (ERGOCALCIFEROL) 1.25 MG (79892 UT) capsule Take 1 capsule by mouth Every 30 (Thirty) Days.      fexofenadine (ALLEGRA) 180 MG tablet Take 1 tablet by mouth Daily.      lisinopril (PRINIVIL,ZESTRIL) 10 MG tablet Take 1 tablet by mouth Daily.      meloxicam (MOBIC) 15 MG tablet Take 1 tablet by mouth Daily. HELD FOR OR      SUMAtriptan (IMITREX) 50 MG tablet Take 1 tablet by mouth Every 2 (Two) Hours As Needed for Migraine. Take one tablet at onset of headache. May repeat dose one time in 2 hours if headache not relieved.      Syringe/Needle, Disp, 25G X 1\" 3 ML misc Use with b12 injections 12 each 0    topiramate (TOPAMAX) 50 MG tablet Take 1 tablet by mouth Daily.      venlafaxine XR (EFFEXOR-XR) 37.5 MG 24 hr " "capsule Take 1 capsule by mouth Daily.      venlafaxine XR (EFFEXOR-XR) 75 MG 24 hr capsule Take 1 capsule by mouth Daily.           PROCEDURES  Procedures        PROGRESS, DATA ANALYSIS, CONSULTS, AND MEDICAL DECISION MAKING  All labs have been independently interpreted by me.  All radiology studies have been reviewed by me. All EKG's have been independently viewed and interpreted by me.  Discussion below represents my analysis of pertinent findings related to patient's condition, differential diagnosis, treatment plan and final disposition.    Differential diagnosis includes but is not limited to rib fracture, rib contusion, pneumothorax, pulmonary contusion, hemothorax.    Clinical Scores:                   ED Course as of 06/14/25 1939   Sat Jun 14, 2025 1937 Independently interpreted the x-ray left-sided ribs with PA chest study and my findings are: No rib fracture, no pneumothorax. [MIROSLAVA]   1938 I reviewed the x-ray results with the patient at the bedside.  Her physical exam does show some evidence of chest wall bruising on the left posterior lateral aspect.  However her work of breathing is totally normal and saturations are 100% on room air.  Her blood pressures remained slightly high throughout this ED visit.  I have low suspicion for internal hemorrhage based on her current vital signs and the fact that this injury happened about 4 hours ago now.  I offered to conduct a CT scan with contrast to evaluate the intra-abdominal organs.  However she declines that offer at this time and I think that is reasonable given her overall clinical appearance.  Will discharge her home in stable condition.  Will encourage continued symptomatic management and follow-up with PCP.  Will review with her usual \"return to ER\" instructions prior to discharge. [MIROSLAVA]      ED Course User Index  [MIROSLAVA] Jadon Guzman MD           AS OF 19:39 EDT VITALS:    BP - 153/84  HR - 92  TEMP - 97.8 °F (36.6 °C)  O2 SATS - 100%    COMPLEXITY OF " CARE  Admission was considered but after careful review of the patient's presentation, physical examination, diagnostic results, and response to treatment the patient may be safely discharged with outpatient follow-up.      DIAGNOSIS  Final diagnoses:   Rib contusion, left, initial encounter   Abrasion of elbow, unspecified laterality, initial encounter   Sprain of ankle, unspecified laterality, unspecified ligament, initial encounter         DISPOSITION  ED Disposition       ED Disposition   Discharge    Condition   Stable    Comment   --                Please note that portions of this document were completed with a voice recognition program.    Note Disclaimer: At [facility], we believe that sharing information builds trust and better relationships. You are receiving this note because you recently visited [facility]. It is possible you will see health information before a provider has talked with you about it. This kind of information can be easy to misunderstand. To help you fully understand what it means for your health, we urge you to discuss this note with your provider.         Jadon Guzman MD  06/14/25 1932

## 2025-06-16 ENCOUNTER — TELEPHONE (OUTPATIENT)
Dept: NEUROLOGY | Facility: CLINIC | Age: 60
End: 2025-06-16
Payer: COMMERCIAL

## 2025-06-17 ENCOUNTER — TELEPHONE (OUTPATIENT)
Dept: NEUROLOGY | Facility: CLINIC | Age: 60
End: 2025-06-17

## 2025-06-17 NOTE — TELEPHONE ENCOUNTER
Caller: Vivienne Barbour    Relationship: Self    Best call back number: [phone]     What was the call regarding: THE PT WAS CONTACTED BY [employer] STATING HER MRI/MRA APPTS WERE CANCELED. SHE STATED THAT THEY INFORMED HER THAT HER MRI/MRA WAS DONE AT [facility] AND SHE STATED THAT DID NOT HAPPEN. SHE WOULD LIKE A CALL  BACK REGARDING THIS ISSUE AND MIX UP AND THE REASON WHY THINGS WERE CHANGED.,     SHE WAS ABLE TO GET IT RESCHEDULED AT [facility] BUT ITS NOT FOR ANOTHER 2 WEEKS AND SHE COULD NOT GET HER OG APPT TIMES DUE TO THEM ALREADY BEING FILLED    PLEASE ADVISE  THANK YOU

## 2025-06-23 NOTE — TELEPHONE ENCOUNTER
Caller: STELLA KOHLI/ [facility]     Relationship:      Best call back number: [phone]     What is the best time to reach you:      Who are you requesting to speak with (clinical staff, provider,  specific staff member): JOSEPH IGLESIAS    What was the call regarding:  [facility] CAN DO ALL THREE IMAGES FOR THE PT INCLUDING THE NECK.    PT HAS BEEN SCHEDULED FOR ALL 3 ON 7-1-25 AT 3 PM.    IF ANY QUESTIONS, PLEASE CALL AND ASK FOR STELLA.

## 2025-07-07 ENCOUNTER — PATIENT MESSAGE (OUTPATIENT)
Dept: NEUROLOGY | Facility: CLINIC | Age: 60
End: 2025-07-07
Payer: COMMERCIAL

## 2025-07-16 DIAGNOSIS — R41.3 MEMORY LOSS: ICD-10-CM

## 2025-07-16 DIAGNOSIS — R42 DIZZINESS: ICD-10-CM

## 2025-08-15 ENCOUNTER — OFFICE VISIT (OUTPATIENT)
Dept: NEUROLOGY | Facility: CLINIC | Age: 60
End: 2025-08-15
Payer: COMMERCIAL

## 2025-08-15 VITALS
DIASTOLIC BLOOD PRESSURE: 68 MMHG | BODY MASS INDEX: 33.8 KG/M2 | WEIGHT: 223 LBS | SYSTOLIC BLOOD PRESSURE: 122 MMHG | HEIGHT: 68 IN | HEART RATE: 91 BPM | OXYGEN SATURATION: 100 %

## 2025-08-15 DIAGNOSIS — E53.8 B12 DEFICIENCY: ICD-10-CM

## 2025-08-15 DIAGNOSIS — R42 DIZZINESS: Primary | ICD-10-CM

## 2025-08-15 DIAGNOSIS — G25.0 BENIGN ESSENTIAL TREMOR: ICD-10-CM

## 2025-08-15 DIAGNOSIS — R68.89 FORGETFULNESS: ICD-10-CM

## 2025-08-15 PROCEDURE — 99214 OFFICE O/P EST MOD 30 MIN: CPT | Performed by: PSYCHIATRY & NEUROLOGY

## 2025-08-15 RX ORDER — AZELASTINE 1 MG/ML
1 SPRAY, METERED NASAL 2 TIMES DAILY
COMMUNITY
Start: 2025-07-22 | End: 2025-08-21

## 2025-08-15 RX ORDER — MONTELUKAST SODIUM 10 MG/1
10 TABLET ORAL NIGHTLY
COMMUNITY
Start: 2025-07-22 | End: 2026-01-18

## (undated) DEVICE — APPL CHLORAPREP HI/LITE 26ML ORNG

## (undated) DEVICE — STPLR SKIN VISISTAT WD 35CT

## (undated) DEVICE — SUT SILK 2/0 SH 30IN K833H

## (undated) DEVICE — ELECTRD BLD EZ CLN MOD XLNG 2.75IN

## (undated) DEVICE — TRAP FLD MINIVAC MEGADYNE 100ML

## (undated) DEVICE — SYR LL TP 10ML STRL

## (undated) DEVICE — CVR PROB 96IN LF STRL

## (undated) DEVICE — NDL HYPO ECLPS SFTY 22G 1 1/2IN

## (undated) DEVICE — ADHS SKIN DERMABOND TOP ADVANCED

## (undated) DEVICE — KT INK TISS MARGINMARKER STD 6COLOR

## (undated) DEVICE — GLV SURG SENSICARE POLYISPRN W/ALOE PF LF 6.5 GRN STRL

## (undated) DEVICE — ANTIBACTERIAL UNDYED BRAIDED (POLYGLACTIN 910), SYNTHETIC ABSORBABLE SUTURE: Brand: COATED VICRYL

## (undated) DEVICE — GOWN,SIRUS,NON REINFRCD,LARGE,SET IN SL: Brand: MEDLINE

## (undated) DEVICE — PK UNIV COMPL 40

## (undated) DEVICE — PENCL E/S ULTRAVAC TELESCP NOSE HOLSTR 10FT

## (undated) DEVICE — SUT MNCRYL PLS ANTIB UD 4/0 PS2 18IN

## (undated) DEVICE — MARKR SKIN W/RULR AND LBL

## (undated) DEVICE — LEGGINGS, PAIR, 31X48, STERILE: Brand: MEDLINE

## (undated) DEVICE — GLV SURG SENSICARE PI MIC PF SZ6.5 LF STRL